# Patient Record
Sex: FEMALE | Race: WHITE | NOT HISPANIC OR LATINO | Employment: FULL TIME | URBAN - METROPOLITAN AREA
[De-identification: names, ages, dates, MRNs, and addresses within clinical notes are randomized per-mention and may not be internally consistent; named-entity substitution may affect disease eponyms.]

---

## 2017-01-05 ENCOUNTER — ALLSCRIPTS OFFICE VISIT (OUTPATIENT)
Dept: OTHER | Facility: OTHER | Age: 27
End: 2017-01-05

## 2017-03-03 ENCOUNTER — GENERIC CONVERSION - ENCOUNTER (OUTPATIENT)
Dept: OTHER | Facility: OTHER | Age: 27
End: 2017-03-03

## 2017-10-25 ENCOUNTER — ALLSCRIPTS OFFICE VISIT (OUTPATIENT)
Dept: OTHER | Facility: OTHER | Age: 27
End: 2017-10-25

## 2017-10-26 NOTE — PROCEDURES
Assessment  1  Amenorrhea (626 0) (N91 2)    Discussion/Summary  Discussion Summary:   LMP was 08/16/2017, corrected due date is 06/16/2018  Active Problems  1  Asthma (493 90) (J45 909)   2  Exercise-induced asthma (493 81) (J45 990)   3  H/O migraine (V12 49) (Z86 69)   4  Immune to varicella (V49 89) (Z78 9)   5  Umbilical hernia without obstruction and without gangrene (553 1) (K42 9)   6  Denied: History of Unknown varicella vaccination status    Current Meds  1  Prenatal Vitamin TABS; Therapy: (Recorded:25Oct2017) to Recorded    Allergies  1  Penicillins  2  No Known Environmental Allergies   3  No Known Food Allergies    Results/Data  [id number] Transvaginal Ultrasound OB [facility]:   Procedure: [id number] pregnant uterus real time with image documentation, fetal and maternal evaluation, first trimester (<14 weeks 0 days), transvaginal approach: single or first gestation  -- The study was done today in the office  Indication: EDC gestational age 8 0/7 weeks  Exam indication: amenorrhea  Findings:   Number of gestational sacs and fetuses: one  Gestational sac/fetal measurements appropriate for gestation: CRL is 0 69 cm equals 6 4/7 weeks with due date of 06/16/2018  Survey of visible fetal and placental anatomic structure cardiac motion is present  Qualitative assessment of amniotic fluid volume/gestational sac shape: nl    Exam of maternal uterus and adnexa: nl    Impression: viable IUP        Signatures   Electronically signed by : JOSHUA Evans ; Oct 25 2017  4:00PM EST                       (Author)

## 2017-12-01 ENCOUNTER — ALLSCRIPTS OFFICE VISIT (OUTPATIENT)
Dept: OTHER | Facility: OTHER | Age: 27
End: 2017-12-01

## 2017-12-01 ENCOUNTER — GENERIC CONVERSION - ENCOUNTER (OUTPATIENT)
Dept: OTHER | Facility: OTHER | Age: 27
End: 2017-12-01

## 2017-12-01 DIAGNOSIS — Z34.90 ENCOUNTER FOR SUPERVISION OF NORMAL PREGNANCY: ICD-10-CM

## 2017-12-06 ENCOUNTER — GENERIC CONVERSION - ENCOUNTER (OUTPATIENT)
Dept: OTHER | Facility: OTHER | Age: 27
End: 2017-12-06

## 2017-12-06 PROCEDURE — 87491 CHLMYD TRACH DNA AMP PROBE: CPT | Performed by: OBSTETRICS & GYNECOLOGY

## 2017-12-06 PROCEDURE — 87591 N.GONORRHOEAE DNA AMP PROB: CPT | Performed by: OBSTETRICS & GYNECOLOGY

## 2017-12-07 ENCOUNTER — LAB REQUISITION (OUTPATIENT)
Dept: LAB | Facility: HOSPITAL | Age: 27
End: 2017-12-07
Payer: COMMERCIAL

## 2017-12-07 DIAGNOSIS — Z34.90 ENCOUNTER FOR SUPERVISION OF NORMAL PREGNANCY: ICD-10-CM

## 2017-12-08 LAB
CHLAMYDIA DNA CVX QL NAA+PROBE: NORMAL
N GONORRHOEA DNA GENITAL QL NAA+PROBE: NORMAL

## 2018-01-05 ENCOUNTER — ALLSCRIPTS OFFICE VISIT (OUTPATIENT)
Dept: OTHER | Facility: OTHER | Age: 28
End: 2018-01-05

## 2018-01-05 ENCOUNTER — GENERIC CONVERSION - ENCOUNTER (OUTPATIENT)
Dept: OTHER | Facility: OTHER | Age: 28
End: 2018-01-05

## 2018-01-09 NOTE — MISCELLANEOUS
Message   Recorded as Task   Date: 12/28/2016 12:26 PM, Created By: Randolph Tierney   Task Name: Care Coordination   Assigned To: Zari Lane   Regarding Patient: Andra Ellison, Status: In Progress   Spring Elizalde - 28 Dec 2016 12:26 PM     TASK CREATED  Patient concerned about cost of [address] [place] [address], requesting a cheaper/generic oral contraceptive be sent to [address] [place] [address] S - 28 Dec 2016 1:03 PM     TASK [address],[facility]  [address] - 28 Dec 2016 1:11 PM     TASK EDITED  I told pt [facility] for 9$ a pack  Rx sprintec, sig 1 od, 1 mo 4 refills to [facility] in [facility]        Active Problems    1  Bacteria in urine (791 9) (R82 71)   2  Encounter for fetal anatomic survey (V28 81) (Z36)   3  Encounter for screening for risk of pre-term labor (V28 82) (Z36)   4  Encounter for supervision of other normal pregnancy in third trimester (V22 1) (Z34 83)   5  Exercise-induced asthma (493 81) (J45 990)   6  H/O migraine (V12 49) (Z86 69)   7  Immune to varicella (V49 89) (Z78 9)   8  Need for prophylactic vaccination with combined diphtheria-tetanus-pertussis (DTaP)   vaccine (V06 1) (Z23)   9  Oral contraceptive prescribed (V25 01) (Z30 011)   10  Pregnancy (V22 2) (Z33 1)   11  Denied: History of Unknown varicella vaccination status    Current Meds   1  Sprintec 28 0 25-35 MG-MCG Oral Tablet; Take 1 tablet daily; Therapy: 51QGK0375 to (Evaluate:24Mar2017)  Requested for: 16Hfp3872; Last   Rx:29Blo6148 Ordered   2  Sprintec 28 0 25-35 MG-MCG Oral Tablet; TAKE 1 TABLET DAILY; Therapy: 07ROC7213 to (Evaluate:04Kck6929) Recorded    Allergies    1  Penicillins    2  No Known Environmental Allergies   3  No Known Food Allergies    Signatures   Electronically signed by :  Gordy Higuera, ; Dec 28 2016  1:11PM EST                       (Author)

## 2018-01-14 VITALS
TEMPERATURE: 97.7 F | BODY MASS INDEX: 24.1 KG/M2 | WEIGHT: 136 LBS | HEIGHT: 63 IN | SYSTOLIC BLOOD PRESSURE: 110 MMHG | RESPIRATION RATE: 12 BRPM | DIASTOLIC BLOOD PRESSURE: 70 MMHG | HEART RATE: 70 BPM

## 2018-01-14 VITALS
DIASTOLIC BLOOD PRESSURE: 62 MMHG | BODY MASS INDEX: 24.45 KG/M2 | SYSTOLIC BLOOD PRESSURE: 104 MMHG | HEIGHT: 63 IN | WEIGHT: 138 LBS

## 2018-01-14 NOTE — MISCELLANEOUS
Message   Recorded as Task  Date: 04/02/2016 02:24 AM, Created By: Amor Jasso  Task Name: Review Document  Assigned To: Karen Singh  Regarding Patient: Dianna Monk, Status: In Progress  Comment:   Karen Singh - 02 Apr 2016 2:24 AM    TASK CREATED  please call [facility]- labs are normal except for UTI   please order macrobid 100mg po bid thank you  Minerva Cook - 04 Apr 2016 11:13 AM    TASK REPLIED TO: Previously Assigned To ESSIEGA OB,Team  [facility]mom to [facility]  JoeyMinerva - 04 Apr 2016 11:19 AM    TASK IN PROGRESS  Minerva ariza - 04 Apr 2016 12:12 PM    TASK REPLIED TO: Previously Assigned To JANEL OB,Team  p/c to pt aware  Rx called to pt's pharmacy  We discussed good hygiene practices and to increase fluids  Active Problems    1  Bacteria in urine (791 9) (N39 0)   2  Encounter for supervision of other normal pregnancy in first trimester (V22 1) (Z34 81)   3  Exercise-induced asthma (493 81) (J45 990)   4  H/O migraine (V12 49) (Z86 69)   5  Immune to varicella (V49 89) (Z78 9)   6  Need for prophylactic vaccination with combined diphtheria-tetanus-pertussis (DTaP)   vaccine (V06 1) (Z23)   7  Pregnancy, obstetrical care (V22 1) (Z34 90)   8  Denied: History of Unknown varicella vaccination status    Current Meds   1  Flintstones Complete 60 MG Oral Tablet Chewable; Therapy: (Recorded:30Mar2016) to Recorded   2  Nitrofurantoin Macrocrystal 100 MG Oral Capsule; TAKE 1 CAPSULE EVERY 12 HOURS   DAILY; Therapy: 04Apr2016 to (Evaluate:28Hnm5631) Recorded    Allergies    1  Penicillins    2  No Known Environmental Allergies   3   No Known Food Allergies    Signatures   Electronically signed by : Lali Peoples, ; Apr 4 2016 12:13PM EST                       (Author)

## 2018-01-16 NOTE — PROGRESS NOTES
2016         RE: Charlotte Taveras                                To: [facility]  MR#: [id number]                                  [address]   : [address]   ENC: [id number]:PATRICIA Estrella, [address]   (Exam #: [id number])                           Fax: [phone]      The LMP of this 26year old,  G2, P1-0-0-1 patient was 2016, giving   her an YONNY of OCT 25 2016 and a current gestational age of 25 weeks 0 days   by dates  A sonographic examination was performed on 2016 using real   time equipment  The ultrasound examination was performed using abdominal &   vaginal techniques  The patient has a BMI of 23 7  Her blood pressure   today was 109/73  Earliest ultrasound found in her record: 3/30/16 10w6d 10/20/16 YONNY      Thank you very much for your kind referral of Charlotte Taveras to the   [facility]  in [address] [place] [address] on 2016 for level II ultrasound   evaluation and  assessment  Maury Mendez is a 78-year-old  2   para 26 white female who is currently at 20-0/7 weeks gestation by an   estimated due date of 2016  Her prenatal course so far has   been unremarkable  Maury Mendez has no complaints  She denies vaginal   bleeding  She reports fetal movement  She declined genetic screening   earlier in the pregnancy      Maury Mendez has a history of a prior vaginal delivery at term in    following an uncomplicated prenatal course  She delivered a 6 lbs  4 oz    baby girl, currently healthy  She has a history of mild, intermittent   asthma and migraine headaches  Her past medical and surgical histories are   otherwise unremarkable  She currently takes no medication with the   exception of a vitamin on a daily basis  She has an allergy to penicillin  Maury Mendez denies tobacco, alcohol, or illicit drug use during the pregnancy        Cardiac motion was observed at 147 bpm       INDICATIONS      fetal anatomical survey      Exam Types      Transvaginal   LEVEL II      RESULTS      Fetus # 1 of 1   Vertex presentation   Fetal growth appeared normal   Placenta Location = Posterior   No placenta previa   Placenta Grade = I      MEASUREMENTS (* Included In Average GA)      AC              16 0 cm        20 weeks 6 days* (67%)   BPD              5 0 cm        21 weeks 2 days* (83%)   HC              18 5 cm        20 weeks 5 days* (68%)   Femur            3 4 cm        20 weeks 5 days* (54%)      Nuchal Fold      4 7 mm      Humerus          3 2 cm        20 weeks 6 days  (70%)   Radius           2 9 cm        22 weeks 2 days   Ulna             3 4 cm        23 weeks 2 days   Tibia            3 1 cm        21 weeks 4 days  (88%)   Fibula           3 1 cm        20 weeks 5 days   Foot             3 1 cm        19 weeks 4 days      Cerebellum       2 3 cm        22 weeks 2 days   Biorbit          3 3 cm        20 weeks 6 days   CisternaMagna    5 9 mm      HC/AC           1 15   FL/AC           0 21   FL/BPD          0 67   EFW (Ac/Fl/Hc)   380 grams - 0 lbs 13 oz      THE AVERAGE GESTATIONAL AGE is 20 weeks 6 days +/- 10 days  AMNIOTIC FLUID         Largest Vertical Pocket = 4 9 cm   Amniotic Fluid: Normal      ANATOMY SUMMARY      The fetal cranium appeared normal in shape  Choroid plexus cysts are not   present  The lateral ventricles were not dilated and the midline   structures were not deviated  The cerebellum and cisterna magna were   visualized and appeared normal  The nuchal fold is not abnormally   thickened, measured at 4 7 mm  The calvarium showed no evidence of defect,   scalloping of the parietal bones or abnormal shape  The cavum septum   pellucidum appears normal  The fetal face appears normal  There is no   evidence of facial clefting, hypotelorism, hypertelorism or micrognathia     A normal appearing nasal bone measuring 8 3 mm was identified in the   sagittal profile view  3-D views of the face appeared normal  Anatomy of   the fetal thorax appeared within normal limits  The fetal diaphragm   appears intact  There were no intrathoracic masses noted or evidence of   pleural/pericardial effusion  The cardiac arch views appeared normal     There was no suspicion of tricuspid regurgitation  The cardiac size and   structures appeared sonographically normal at the four chamber view, and   cardiac rhythm was regular  There is no suspicion of fetal dysrhythmia  There was no evidence of cardiomegaly, pericardial effusion or   atrial/ventricular disproportion  Two atrioventricular valves were noted   with normal inflow, confirmed with color Doppler imaging  Ventriculoarterial connections are appropriate and normal short axis of   the great vessels is demonstrated  The interventricular septum appeared to   be intact  There is no suspicion of an echogenic intracardiac focus  The   three vessel  tracheal view appears normal   The outflow tract views are   normal  The abdominal cavity appears normal  There is no evidence of fetal   bowel obstruction or abnormally echogenic bowel  Ascites is not present  The fetal stomach appears normal in size and shape  The right kidney   appears normal  There is no suspicion of pyelectasis  Renal cysts are not   present  The echogenicity of the kidney is normal  The left kidney   appears normal   There is no suspicion of pyelectasis  The echogenicity   of the kidney is normal   renal cysts are not present  The fetal bladder   appears normal in size and shape  There is no suspicion of ureterocele  The abdominal wall appears intact  A normal abdominal cord insertion is   noted  The spine was visualized from cervical to sacral region, within the   resolution of the ultrasound equipment, without evidence of a neural tube   defect  or other malformation   Active movement of the extremities was seen   and fetal body motion was also observed during this examination  There was   no evidence of long bone abnormality and the extremities were followed to   their distal extent  There was no evidence of club foot or rocker bottom   deformity  There was no evidence of abnormal hand posturing noted  The   genitalia appeared normal  The placenta appears normal  There is no   evidence of advanced placental maturation, placental abruption,   intervillous thrombosis, placental infarction or multiple venous lakes  There is a 3 vessel cord with normal insertion site  The uterine contour   appears normal  There is no suspicion of a uterine myoma  ADNEXA      The left ovary was not visualized  The right ovary appeared normal and   measured 3 6 x 2 1 x 1 8 cm with a volume of 7 1 cc  UTERINE ARTERIES                                  S/D   PI    RI    NOTCH       Left Uterine Artery              1 27         No       Right Uterine Artery             0 76         No      CERVICAL EVALUATION      The cervix appeared normal (Ultrasound Examination)  SUPINE      Cervical Length: 3 50 cm      OTHER TEST RESULTS           Funneling?: No             Dynamic Changes?: No        Resp  To TFP?: No      IMPRESSION      López IUP   20 weeks and 6 days by this ultrasound  (YONNY=OCT 19 2016)   Vertex presentation   Fetal growth appeared normal   Normal anatomy survey   Regular fetal heart rate of 147 bpm   Posterior placenta   No placenta previa      GENERAL COMMENT      No fetal structural abnormality or ultrasound marker for aneuploidy is   identified on the Level II ultrasound study today  Fetal growth, amniotic   fluid volume, and maternal uterine artery Doppler study are normal   The   placenta is normal in appearance  The cervix is normal in appearance by transvaginal sonography  The   cervical length is normal   Cervical debris is not present  Cervical   funneling is not present  Neither provocative nor dynamic change is   appreciated        Today's ultrasound findings and suggested follow-up were discussed in   detail with Bianca Nance  We discussed that prenatal ultrasound cannot rule   out all congenital abnormalities  No further appointment has been   scheduled in the [address] [place] [address] for Bianca Nance at this time  Follow-up   [facility] ultrasound evaluation is recommended as clinically indicated  The face to face time, in addition to time spent discussing ultrasound   results, was 10 minutes, greater than 50% of which was spent during   counseling and coordination of care  KEVIN Aggarwal M D     Maternal-Fetal Medicine   Electronically signed 06/08/16 10:45

## 2018-01-16 NOTE — MISCELLANEOUS
Message   Recorded as Task   Date: 03/02/2017 04:38 PM, Created By: Reece Gill   Task Name: Call Back   Assigned To: Renard Multani   Regarding Patient: Nate Coleman, Status: In Progress   Comment:    Sasha Nix - 02 Mar 2017 4:38 PM     TASK CREATED  Caller: Self; Other; [phone] (Home); [phone] (Work)  pts bc sprintec is making her nauseous,  she would like something else prescribed if possible, pls call pt to discuss @[phone],  d87 is prov   Sandra Muhammad - 02 Mar 2017 5:17 PM     TASK IN PROGRESS   Sandra Muhammad - 02 Mar 2017 5:23 PM     TASK REPLIED TO: Previously Assigned To Terri Koehler                      Pt on [address] [place] [address] and she's getting nauseous  Tried with/without food and different times of day/night still making her sick  She does not want depo, she does not want an IUD at this point  Mario Emily said the last time she took ocp's she tried 7 before she found one that worked and unfortunately can't remember name of it  Can you please suggest another pill  ([facility] [place])  Thank you   Terri Koehler - 02 Mar 2017 5:45 PM     TASK REPLIED TO: Previously Assigned To [facility]  Let's try Loestrin 24  That has a generic so should not be too expensive but is a lower dose  ERx sent  Please just let her know  Thanks!! Marybeth Gu - 03 Mar 2017 8:44 AM     TASK EDITED  Patient notified Shannan Jara is at her pharmacy  Active Problems    1  Asthma (493 90) (J45 909)   2  Bacteria in urine (791 9) (R82 71)   3  Encounter for fetal anatomic survey (V28 81) (Z36)   4  Encounter for screening for risk of pre-term labor (V28 82) (Z36)   5  Encounter for supervision of other normal pregnancy in third trimester (V22 1) (Z34 83)   6  Exercise-induced asthma (493 81) (J45 990)   7  H/O migraine (V12 49) (Z86 69)   8  Immune to varicella (V49 89) (Z78 9)   9   Need for prophylactic vaccination with combined diphtheria-tetanus-pertussis (DTaP)   vaccine (V06 1) (Z23)   10  Oral contraceptive prescribed (V25 01) (Z30 011)   11  Umbilical hernia without obstruction and without gangrene (553 1) (K42 9)   12  Denied: History of Unknown varicella vaccination status    Current Meds   1  Lomedia 24 FE 1-20 MG-MCG(24) Oral Tablet; Take 1 pill daily; Therapy: 61UVF3736 to (Last Rx:02Mar2017)  Requested for: 99YEY5341 Ordered   2  Sprintec 28 0 25-35 MG-MCG Oral Tablet; Take 1 tablet daily; Therapy: 21EXD1961 to (Evaluate:24Mar2017)  Requested for: 86Idz7970; Last   Rx:14Jdm5503 Ordered   3  Sprintec 28 0 25-35 MG-MCG Oral Tablet; TAKE 1 TABLET DAILY; Therapy: 26UXF9840 to (Evaluate:22Vav8275) Recorded    Allergies    1  Penicillins    2  No Known Environmental Allergies   3   No Known Food Allergies    Signatures   Electronically signed by : Ishan Funez, ; Mar  3 2017  8:46AM EST                       (Author)

## 2018-01-24 VITALS
WEIGHT: 140 LBS | BODY MASS INDEX: 24.8 KG/M2 | SYSTOLIC BLOOD PRESSURE: 110 MMHG | DIASTOLIC BLOOD PRESSURE: 70 MMHG | HEIGHT: 63 IN

## 2018-01-24 VITALS
DIASTOLIC BLOOD PRESSURE: 60 MMHG | HEIGHT: 63 IN | BODY MASS INDEX: 24.1 KG/M2 | SYSTOLIC BLOOD PRESSURE: 100 MMHG | WEIGHT: 136 LBS

## 2018-01-24 VITALS
DIASTOLIC BLOOD PRESSURE: 68 MMHG | BODY MASS INDEX: 24.63 KG/M2 | SYSTOLIC BLOOD PRESSURE: 112 MMHG | HEIGHT: 63 IN | WEIGHT: 139 LBS

## 2018-02-03 LAB
ABO GROUP BLD: NORMAL
APPEARANCE UR: CLEAR
BACTERIA UR CULT: NORMAL
BACTERIA URNS QL MICRO: ABNORMAL
BASOPHILS # BLD AUTO: 0 X10E3/UL (ref 0–0.2)
BASOPHILS NFR BLD AUTO: 0 %
BILIRUB UR QL STRIP: NEGATIVE
BLD GP AB SCN SERPL QL: NEGATIVE
COLOR UR: YELLOW
EOSINOPHIL # BLD AUTO: 0.1 X10E3/UL (ref 0–0.4)
EOSINOPHIL NFR BLD AUTO: 1 %
EPI CELLS #/AREA URNS HPF: ABNORMAL /HPF
ERYTHROCYTE [DISTWIDTH] IN BLOOD BY AUTOMATED COUNT: 14.9 % (ref 12.3–15.4)
GLUCOSE UR QL: NEGATIVE
HBV SURFACE AB SER QL: REACTIVE
HBV SURFACE AG SERPL QL IA: NEGATIVE
HCT VFR BLD AUTO: 32 % (ref 34–46.6)
HGB BLD-MCNC: 11.1 G/DL (ref 11.1–15.9)
HGB UR QL STRIP: NEGATIVE
HIV 1+2 AB+HIV1 P24 AG SERPL QL IA: NON REACTIVE
IMM GRANULOCYTES # BLD: 0 X10E3/UL (ref 0–0.1)
IMM GRANULOCYTES NFR BLD: 0 %
KETONES UR QL STRIP: NEGATIVE
LEUKOCYTE ESTERASE UR QL STRIP: ABNORMAL
LYMPHOCYTES # BLD AUTO: 2.2 X10E3/UL (ref 0.7–3.1)
LYMPHOCYTES NFR BLD AUTO: 20 %
Lab: NO GROWTH
MCH RBC QN AUTO: 31.7 PG (ref 26.6–33)
MCHC RBC AUTO-ENTMCNC: 34.7 G/DL (ref 31.5–35.7)
MCV RBC AUTO: 91 FL (ref 79–97)
MICRO URNS: ABNORMAL
MONOCYTES # BLD AUTO: 0.8 X10E3/UL (ref 0.1–0.9)
MONOCYTES NFR BLD AUTO: 7 %
MUCOUS THREADS URNS QL MICRO: PRESENT
NEUTROPHILS # BLD AUTO: 8.2 X10E3/UL (ref 1.4–7)
NEUTROPHILS NFR BLD AUTO: 72 %
NITRITE UR QL STRIP: NEGATIVE
PH UR STRIP: 6 [PH] (ref 5–7.5)
PLATELET # BLD AUTO: 245 X10E3/UL (ref 150–379)
PROT UR QL STRIP: NEGATIVE
RBC # BLD AUTO: 3.5 X10E6/UL (ref 3.77–5.28)
RBC #/AREA URNS HPF: ABNORMAL /HPF
RH BLD: POSITIVE
RPR SER QL: NON REACTIVE
RUBV IGG SERPL IA-ACNC: 21.3 INDEX
SP GR UR: 1.02 (ref 1–1.03)
UROBILINOGEN UR STRIP-ACNC: 0.2 EU/DL (ref 0.2–1)
WBC # BLD AUTO: 11.4 X10E3/UL (ref 3.4–10.8)
WBC #/AREA URNS HPF: ABNORMAL /HPF

## 2018-02-09 ENCOUNTER — ROUTINE PRENATAL (OUTPATIENT)
Dept: OBGYN CLINIC | Facility: MEDICAL CENTER | Age: 28
End: 2018-02-09

## 2018-02-09 ENCOUNTER — ROUTINE PRENATAL (OUTPATIENT)
Dept: PERINATAL CARE | Facility: MEDICAL CENTER | Age: 28
End: 2018-02-09
Payer: COMMERCIAL

## 2018-02-09 VITALS
DIASTOLIC BLOOD PRESSURE: 67 MMHG | WEIGHT: 139.6 LBS | BODY MASS INDEX: 24.73 KG/M2 | SYSTOLIC BLOOD PRESSURE: 116 MMHG | HEIGHT: 63 IN | HEART RATE: 89 BPM

## 2018-02-09 VITALS — WEIGHT: 139.2 LBS | BODY MASS INDEX: 24.66 KG/M2 | DIASTOLIC BLOOD PRESSURE: 76 MMHG | SYSTOLIC BLOOD PRESSURE: 120 MMHG

## 2018-02-09 DIAGNOSIS — Z36.3 ENCOUNTER FOR ANTENATAL SCREENING FOR MALFORMATIONS: ICD-10-CM

## 2018-02-09 DIAGNOSIS — Z36.86 ENCOUNTER FOR ANTENATAL SCREENING FOR CERVICAL LENGTH: Primary | ICD-10-CM

## 2018-02-09 DIAGNOSIS — Z3A.21 21 WEEKS GESTATION OF PREGNANCY: Primary | ICD-10-CM

## 2018-02-09 PROCEDURE — 76805 OB US >/= 14 WKS SNGL FETUS: CPT | Performed by: OBSTETRICS & GYNECOLOGY

## 2018-02-09 PROCEDURE — 76817 TRANSVAGINAL US OBSTETRIC: CPT | Performed by: OBSTETRICS & GYNECOLOGY

## 2018-02-09 PROCEDURE — 99212 OFFICE O/P EST SF 10 MIN: CPT | Performed by: OBSTETRICS & GYNECOLOGY

## 2018-02-09 PROCEDURE — PNV: Performed by: OBSTETRICS & GYNECOLOGY

## 2018-02-09 NOTE — PROGRESS NOTES
Please refer to the [facility] ultrasound report for additional information regarding the visit of Norleen Moritz to the [facility]  in [employer] today

## 2018-02-09 NOTE — PROGRESS NOTES
Feels well  No problems  Just had level 2, report pending  WNL per pt   3cm umbilical hernia on exam, precautions reviewed  Undecided about flu vacc, benefits reviewed

## 2018-02-09 NOTE — LETTER
February 9, 2018     Gifty Emanuel MD  [facility]  [address] [address]    Patient: Jm Yoon   YOB: 1990   Date of Visit: 2/9/2018       Dear Dr Jamin Mancilla: Thank you for referring Jm Yoon to me for evaluation  Below are my notes for this consultation  If you have questions, please do not hesitate to call me  I look forward to following your patient along with you           Sincerely,        Nathalie Tubbs MD        CC: No Recipients

## 2018-03-06 ENCOUNTER — ROUTINE PRENATAL (OUTPATIENT)
Dept: OBGYN CLINIC | Facility: CLINIC | Age: 28
End: 2018-03-06

## 2018-03-06 VITALS — SYSTOLIC BLOOD PRESSURE: 122 MMHG | DIASTOLIC BLOOD PRESSURE: 52 MMHG | BODY MASS INDEX: 25.86 KG/M2 | WEIGHT: 146 LBS

## 2018-03-06 DIAGNOSIS — Z34.82 ENCOUNTER FOR SUPERVISION OF OTHER NORMAL PREGNANCY IN SECOND TRIMESTER: Primary | ICD-10-CM

## 2018-03-06 DIAGNOSIS — Z3A.28 28 WEEKS GESTATION OF PREGNANCY: ICD-10-CM

## 2018-03-06 PROBLEM — J45.909 ASTHMA: Status: ACTIVE | Noted: 2017-01-05

## 2018-03-06 PROBLEM — K42.9 UMBILICAL HERNIA WITHOUT OBSTRUCTION AND WITHOUT GANGRENE: Status: ACTIVE | Noted: 2017-01-05

## 2018-03-06 PROCEDURE — PNV: Performed by: NURSE PRACTITIONER

## 2018-03-06 NOTE — PROGRESS NOTES
Problem List Items Addressed This Visit     None      Visit Diagnoses     Encounter for supervision of other normal pregnancy in second trimester    -  Primary    28 weeks gestation of pregnancy        Relevant Orders    RPR    CBC and differential    Glucose, 1H PG        Feels great  Here with her children  Declines immunizations until next visit  Reports good fetal movement  28 week lab slip given

## 2018-03-06 NOTE — PATIENT INSTRUCTIONS
Pregnancy at 23 to 26 Weeks   AMBULATORY CARE:   What changes are happening to your body: You are now close to or at the beginning of the third trimester  The third trimester starts at 24 weeks and ends with delivery  As your baby gets larger, you may develop certain symptoms  These may include pain in your back or down the sides of your abdomen  You may also have stretch marks on your abdomen, breasts, thighs, or buttocks  You may also have constipation  Seek care immediately if:   · You develop a severe headache that does not go away  · You have new or increased vision changes, such as blurred or spotted vision  · You have new or increased swelling in your face or hands  · You have vaginal spotting or bleeding  · Your water broke or you feel warm water gushing or trickling from your vagina  Contact your healthcare provider if:   · You have abdominal cramps, pressure, or tightening  · You have a change in vaginal discharge  · You have light bleeding  · You have chills or a fever  · You have vaginal itching, burning, or pain  · You have yellow, green, white, or foul-smelling vaginal discharge  · You have pain or burning when you urinate, less urine than usual, or pink or bloody urine  · You have questions or concerns about your condition or care  How to care for yourself at this stage of your pregnancy:   · Eat a variety of healthy foods  Healthy foods include fruits, vegetables, whole-grain breads, low-fat dairy foods, beans, lean meats, and fish  Drink liquids as directed  Ask how much liquid to drink each day and which liquids are best for you  Limit caffeine to less than 200 milligrams each day  Limit your intake of fish to 2 servings each week  Choose fish low in mercury such as canned light tuna, shrimp, salmon, cod, or tilapia  Do not  eat fish high in mercury such as swordfish, tilefish, alfred mackerel, and shark  · Manage back pain    Do not stand for long periods of time or lift heavy items  Use good posture while you stand, squat, or bend  Wear low-heeled shoes with good support  Rest may also help to relieve back pain  Ask your healthcare provider about exercises you can do to strengthen your back muscles  · Take prenatal vitamins as directed  Your need for certain vitamins and minerals, such as folic acid, increases during pregnancy  Prenatal vitamins provide some of the extra vitamins and minerals you need  Prenatal vitamins may also help to decrease the risk of certain birth defects  · Talk to your healthcare provider about exercise  Moderate exercise can help you stay fit  Your healthcare provider will help you plan an exercise program that is safe for you during pregnancy  · Do not smoke  If you smoke, it is never too late to quit  Smoking increases your risk of a miscarriage and other health problems during your pregnancy  Smoking can cause your baby to be born too early or weigh less at birth  Ask your healthcare provider for information if you need help quitting  · Do not drink alcohol  Alcohol passes from your body to your baby through the placenta  It can affect your baby's brain development and cause fetal alcohol syndrome (FAS)  FAS is a group of conditions that causes mental, behavior, and growth problems  · Talk to your healthcare provider before you take any medicines  Many medicines may harm your baby if you take them when you are pregnant  Do not take any medicines, vitamins, herbs, or supplements without first talking to your healthcare provider  Never use illegal or street drugs (such as marijuana or cocaine) while you are pregnant  Safety tips during pregnancy:   · Avoid hot tubs and saunas  Do not use a hot tub or sauna while you are pregnant, especially during your first trimester  Hot tubs and saunas may raise your baby's temperature and increase the risk of birth defects  · Avoid toxoplasmosis    This is an infection caused by eating raw meat or being around infected cat feces  It can cause birth defects, miscarriages, and other problems  Wash your hands after you touch raw meat  Make sure any meat is well-cooked before you eat it  Avoid raw eggs and unpasteurized milk  Use gloves or ask someone else to clean your cat's litter box while you are pregnant  Changes that are happening with your baby:  By 26 weeks, your baby will weigh about 2 pounds  Your baby will be about 10 inches long from the top of the head to the rump (baby's bottom)  Your baby's movements are much stronger now  Your baby's eyes are almost completely formed and can partially open  Your baby also sleeps and wakes up  What you need to know about prenatal care: Your healthcare provider will check your blood pressure and weight  You may also need the following:  · A urine test  may also be done to check for sugar and protein  These can be signs of gestational diabetes or infection  Protein in your urine may also be a sign of preeclampsia  Preeclampsia is a condition that can develop during week 20 or later of your pregnancy  It causes high blood pressure, and it can cause problems with your kidneys and other organs  · Fundal height  is a measurement of your uterus to check your baby's growth  This number is usually the same as the number of weeks that you have been pregnant  · Your baby's heart rate  will be checked  © 2017 [address] [employer] Information is for End User's use only and may not be sold, redistributed or otherwise used for commercial purposes  All illustrations and images included in CareNotes® are the copyrighted property of [employer] M [employer]  or [address]  The above information is an  only  It is not intended as medical advice for individual conditions or treatments  Talk to your doctor, nurse or pharmacist before following any medical regimen to see if it is safe and effective for you

## 2018-03-07 NOTE — PROGRESS NOTES
Education  Baby & Me Education 1st Trimester:   First Trimester Education provided:   benefits of breastfeeding, importance of exclusive breastfeeding, early initiation of breastfeeding, exclusive breastfeeding for the first 6 months and Pregnancy Essentials Reference Guide given   The patient is planning on breastfeeding  Prenatal education provided by: Virgil Barnhart MA      Signatures   Electronically signed by :  [facility], ; Dec  1 2017  5:10PM EST                       (Co-author)

## 2018-03-26 NOTE — PROGRESS NOTES
Spoke with pt - she is currently 28w4d  She is going for her labs this week  Will recheck on Friday

## 2018-04-03 LAB
BASOPHILS # BLD AUTO: 0 X10E3/UL (ref 0–0.2)
BASOPHILS NFR BLD AUTO: 0 %
EOSINOPHIL # BLD AUTO: 0.1 X10E3/UL (ref 0–0.4)
EOSINOPHIL NFR BLD AUTO: 1 %
ERYTHROCYTE [DISTWIDTH] IN BLOOD BY AUTOMATED COUNT: 14 % (ref 12.3–15.4)
GLUCOSE 1H P 50 G GLC PO SERPL-MCNC: 90 MG/DL (ref 65–139)
HCT VFR BLD AUTO: 31.5 % (ref 34–46.6)
HGB BLD-MCNC: 10.5 G/DL (ref 11.1–15.9)
IMM GRANULOCYTES # BLD: 0.1 X10E3/UL (ref 0–0.1)
IMM GRANULOCYTES NFR BLD: 1 %
LYMPHOCYTES # BLD AUTO: 2.2 X10E3/UL (ref 0.7–3.1)
LYMPHOCYTES NFR BLD AUTO: 22 %
MCH RBC QN AUTO: 30.2 PG (ref 26.6–33)
MCHC RBC AUTO-ENTMCNC: 33.3 G/DL (ref 31.5–35.7)
MCV RBC AUTO: 91 FL (ref 79–97)
MONOCYTES # BLD AUTO: 0.8 X10E3/UL (ref 0.1–0.9)
MONOCYTES NFR BLD AUTO: 7 %
NEUTROPHILS # BLD AUTO: 7.1 X10E3/UL (ref 1.4–7)
NEUTROPHILS NFR BLD AUTO: 69 %
PLATELET # BLD AUTO: 257 X10E3/UL (ref 150–379)
RBC # BLD AUTO: 3.48 X10E6/UL (ref 3.77–5.28)
RPR SER QL: NON REACTIVE
WBC # BLD AUTO: 10.2 X10E3/UL (ref 3.4–10.8)

## 2018-04-04 ENCOUNTER — ROUTINE PRENATAL (OUTPATIENT)
Dept: OBGYN CLINIC | Age: 28
End: 2018-04-04

## 2018-04-04 VITALS — SYSTOLIC BLOOD PRESSURE: 112 MMHG | WEIGHT: 151 LBS | DIASTOLIC BLOOD PRESSURE: 66 MMHG | BODY MASS INDEX: 26.75 KG/M2

## 2018-04-04 DIAGNOSIS — Z34.83 ENCOUNTER FOR SUPERVISION OF OTHER NORMAL PREGNANCY IN THIRD TRIMESTER: Primary | ICD-10-CM

## 2018-04-04 PROBLEM — Z34.90 SUPERVISION OF NORMAL PREGNANCY: Status: ACTIVE | Noted: 2018-04-04

## 2018-04-04 PROBLEM — Z34.90 SUPERVISION OF NORMAL PREGNANCY: Status: RESOLVED | Noted: 2018-04-04 | Resolved: 2018-04-04

## 2018-04-04 PROCEDURE — PNV: Performed by: OBSTETRICS & GYNECOLOGY

## 2018-04-04 RX ORDER — CALCIUM CARBONATE 200(500)MG
2 TABLET,CHEWABLE ORAL EVERY 4 HOURS PRN
COMMUNITY
End: 2018-05-30 | Stop reason: HOSPADM

## 2018-04-04 NOTE — PATIENT INSTRUCTIONS
Gastroesophageal Reflux Disease   WHAT YOU NEED TO KNOW:   Gastroesophageal reflux occurs when acid and food in the stomach back up into the esophagus  Gastroesophageal reflux disease (GERD) is reflux that occurs more than twice a week for a few weeks  It usually causes heartburn and other symptoms  GERD can cause other health problems over time if it is not treated  DISCHARGE INSTRUCTIONS:   Return to the emergency department if:   · You feel full and cannot burp or vomit  · You have severe chest pain and sudden trouble breathing  · Your bowel movements are black, bloody, or tarry-looking  · Your vomit looks like coffee grounds or has blood in it  Contact your healthcare provider if:   · You vomit large amounts, or you vomit often  · You have trouble breathing after you vomit  · You have trouble swallowing, or pain with swallowing  · You are losing weight without trying  · Your symptoms get worse or do not improve with treatment  · You have questions or concerns about your condition or care  Medicines:   · Medicines  are used to decrease stomach acid  Medicine may also be used to help your lower esophageal sphincter and stomach contract (tighten) more  · Take your medicine as directed  Contact your healthcare provider if you think your medicine is not helping or if you have side effects  Tell him of her if you are allergic to any medicine  Keep a list of the medicines, vitamins, and herbs you take  Include the amounts, and when and why you take them  Bring the list or the pill bottles to follow-up visits  Carry your medicine list with you in case of an emergency  Manage GERD:   · Do not have foods or drinks that may increase heartburn  These include chocolate, peppermint, fried or fatty foods, drinks that contain caffeine, or carbonated drinks (soda)  Other foods include spicy foods, onions, tomatoes, and tomato-based foods   Do not have foods or drinks that can irritate your esophagus, such as citrus fruits, juices, and alcohol  · Do not eat large meals  When you eat a lot of food at one time, your stomach needs more acid to digest it  Eat 6 small meals each day instead of 3 large ones, and eat slowly  Do not eat meals 2 to 3 hours before bedtime  · Elevate the head of your bed  Place 6-inch blocks under the head of your bed frame  You may also use more than one pillow under your head and shoulders while you sleep  · Maintain a healthy weight  If you are overweight, weight loss may help relieve symptoms of GERD  · Do not smoke  Smoking weakens the lower esophageal sphincter and increases the risk of GERD  Ask your healthcare provider for information if you currently smoke and need help to quit  E-cigarettes or smokeless tobacco still contain nicotine  Talk to your healthcare provider before you use these products  · Do not wear clothing that is tight around your waist   Tight clothing can put pressure on your stomach and cause or worsen GERD symptoms  Follow up with your healthcare provider as directed:  Write down your questions so you remember to ask them during your visits  © 2017 [address] [facility] Information is for End User's use only and may not be sold, redistributed or otherwise used for commercial purposes  All illustrations and images included in CareNotes® are the copyrighted property of [employer]  or [employer]  The above information is an  only  It is not intended as medical advice for individual conditions or treatments  Talk to your doctor, nurse or pharmacist before following any medical regimen to see if it is safe and effective for you

## 2018-04-04 NOTE — PROGRESS NOTES
32 yo  29w4d  Feels well, no complaints  28 week labs wnl  HGb 10 1  Continue PNV, hydration, healthy diet     Problem List Items Addressed This Visit        Other    Supervision of normal pregnancy - Primary     PNC scan - wnl, no further scans scheduled  Declines vaccinations  Readdress at next visit

## 2018-04-16 ENCOUNTER — ROUTINE PRENATAL (OUTPATIENT)
Dept: OBGYN CLINIC | Age: 28
End: 2018-04-16

## 2018-04-16 VITALS — SYSTOLIC BLOOD PRESSURE: 110 MMHG | WEIGHT: 153 LBS | DIASTOLIC BLOOD PRESSURE: 60 MMHG | BODY MASS INDEX: 27.1 KG/M2

## 2018-04-16 DIAGNOSIS — Z3A.31 31 WEEKS GESTATION OF PREGNANCY: ICD-10-CM

## 2018-04-16 DIAGNOSIS — K42.9 UMBILICAL HERNIA WITHOUT OBSTRUCTION AND WITHOUT GANGRENE: ICD-10-CM

## 2018-04-16 DIAGNOSIS — Z34.83 ENCOUNTER FOR SUPERVISION OF OTHER NORMAL PREGNANCY IN THIRD TRIMESTER: Primary | ICD-10-CM

## 2018-04-16 PROCEDURE — PNV: Performed by: NURSE PRACTITIONER

## 2018-04-16 NOTE — PROGRESS NOTES
Problem List Items Addressed This Visit     Umbilical hernia without obstruction and without gangrene    Supervision of normal pregnancy - Primary    31 weeks gestation of pregnancy        Feels well  Denies LOF/CTX/VB  No concerns  Requests TDAP at next visit  Check in card given  Hgb 10 5-advised iron od

## 2018-04-16 NOTE — PATIENT INSTRUCTIONS
Pregnancy at 31 to [address]:   What changes are happening in my body? You may continue to have symptoms such as shortness of breath, heartburn, contractions, or swelling of your ankles and feet  You may be gaining about 1 pound a week now  How do I care for myself at this stage of my pregnancy? · Eat a variety of healthy foods  Healthy foods include fruits, vegetables, whole-grain breads, low-fat dairy foods, beans, lean meats, and fish  Drink liquids as directed  Ask how much liquid to drink each day and which liquids are best for you  Limit caffeine to less than 200 milligrams each day  Limit your intake of fish to 2 servings each week  Choose fish low in mercury such as canned light tuna, shrimp, salmon, cod, or tilapia  Do not  eat fish high in mercury such as swordfish, tilefish, alfred mackerel, and shark  · Manage heartburn  by eating 4 or 5 small meals each day instead of large meals  Avoid spicy food  · Manage swelling  by lying down and putting your feet up  · Take prenatal vitamins as directed  Your need for certain vitamins and minerals, such as folic acid, increases during pregnancy  Prenatal vitamins provide some of the extra vitamins and minerals you need  Prenatal vitamins may also help to decrease the risk of certain birth defects  · Talk to your healthcare provider about exercise  Moderate exercise can help you stay fit  Your healthcare provider will help you plan an exercise program that is safe for you during pregnancy  · Do not smoke  If you smoke, it is never too late to quit  Smoking increases your risk of a miscarriage and other health problems during your pregnancy  Smoking can cause your baby to be born too early or weigh less at birth  Ask your healthcare provider for information if you need help quitting  · Do not drink alcohol  Alcohol passes from your body to your baby through the placenta   It can affect your baby's brain development and cause fetal alcohol syndrome (FAS)  FAS is a group of conditions that causes mental, behavior, and growth problems  · Talk to your healthcare provider before you take any medicines  Many medicines may harm your baby if you take them when you are pregnant  Do not take any medicines, vitamins, herbs, or supplements without first talking to your healthcare provider  Never use illegal or street drugs (such as marijuana or cocaine) while you are pregnant  What are some safety tips during pregnancy? · Avoid hot tubs and saunas  Do not use a hot tub or sauna while you are pregnant, especially during your first trimester  Hot tubs and saunas may raise your baby's temperature and increase the risk of birth defects  · Avoid toxoplasmosis  This is an infection caused by eating raw meat or being around infected cat feces  It can cause birth defects, miscarriages, and other problems  Wash your hands after you touch raw meat  Make sure any meat is well-cooked before you eat it  Avoid raw eggs and unpasteurized milk  Use gloves or ask someone else to clean your cat's litter box while you are pregnant  What changes are happening with my baby? By 34 weeks, your baby may weigh more than 5 pounds  Your baby will be about 12 ½ inches long from the top of the head to the rump (baby's bottom)  Your baby is gaining about ½ pound a week  Your baby's eyes open and close now  Your baby's kicks and movements are more forceful at this time  What do I need to know about prenatal care? Your healthcare provider will check your blood pressure and weight  You may also need the following:  · A urine test  may also be done to check for sugar and protein  These can be signs of gestational diabetes or infection  Protein in your urine may also be a sign of preeclampsia  Preeclampsia is a condition that can develop during week 20 or later of your pregnancy   It causes high blood pressure, and it can cause problems with your kidneys and other organs  · A Tdap vaccine  may be recommended by your healthcare provider  · Fundal height  is a measurement of your uterus to check your baby's growth  This number is usually the same as the number of weeks that you have been pregnant  Your healthcare provider may also check your baby's position  · Your baby's heart rate  will be checked  When should I seek immediate care? · You develop a severe headache that does not go away  · You have new or increased vision changes, such as blurred or spotted vision  · You have new or increased swelling in your face or hands  · You have vaginal spotting or bleeding  · Your water broke or you feel warm water gushing or trickling from your vagina  When should I contact my healthcare provider? · You have more than 5 contractions in 1 hour  · You notice any changes in your baby's movements  · You have abdominal cramps, pressure, or tightening  · You have a change in vaginal discharge  · You have chills or a fever  · You have vaginal itching, burning, or pain  · You have yellow, green, white, or foul-smelling vaginal discharge  · You have pain or burning when you urinate, less urine than usual, or pink or bloody urine  · You have questions or concerns about your condition or care  CARE AGREEMENT:   You have the right to help plan your care  Learn about your health condition and how it may be treated  Discuss treatment options with your caregivers to decide what care you want to receive  You always have the right to refuse treatment  The above information is an  only  It is not intended as medical advice for individual conditions or treatments  Talk to your doctor, nurse or pharmacist before following any medical regimen to see if it is safe and effective for you    © 2017 [employer] Information is for End User's use only and may not be sold, redistributed or otherwise used for commercial purposes  All illustrations and images included in CareNotes® are the copyrighted property of [employer] M [employer] Inc  or [employer]

## 2018-05-02 ENCOUNTER — ROUTINE PRENATAL (OUTPATIENT)
Dept: OBGYN CLINIC | Age: 28
End: 2018-05-02
Payer: COMMERCIAL

## 2018-05-02 VITALS — SYSTOLIC BLOOD PRESSURE: 112 MMHG | DIASTOLIC BLOOD PRESSURE: 60 MMHG | BODY MASS INDEX: 26.93 KG/M2 | WEIGHT: 152 LBS

## 2018-05-02 DIAGNOSIS — Z23 NEED FOR TDAP VACCINATION: ICD-10-CM

## 2018-05-02 DIAGNOSIS — D64.9 ANEMIA, UNSPECIFIED TYPE: ICD-10-CM

## 2018-05-02 DIAGNOSIS — Z3A.33 33 WEEKS GESTATION OF PREGNANCY: ICD-10-CM

## 2018-05-02 DIAGNOSIS — Z34.83 ENCOUNTER FOR SUPERVISION OF OTHER NORMAL PREGNANCY IN THIRD TRIMESTER: Primary | ICD-10-CM

## 2018-05-02 PROCEDURE — 90715 TDAP VACCINE 7 YRS/> IM: CPT | Performed by: NURSE PRACTITIONER

## 2018-05-02 PROCEDURE — 90471 IMMUNIZATION ADMIN: CPT | Performed by: NURSE PRACTITIONER

## 2018-05-02 PROCEDURE — PNV: Performed by: NURSE PRACTITIONER

## 2018-05-02 NOTE — PATIENT INSTRUCTIONS
Pregnancy at 31 to 34 Weeks   AMBULATORY CARE:   What changes are happening to your body: You may continue to have symptoms such as shortness of breath, heartburn, contractions, or swelling of your ankles and feet  You may be gaining about 1 pound a week now  Seek care immediately if:   · You develop a severe headache that does not go away  · You have new or increased vision changes, such as blurred or spotted vision  · You have new or increased swelling in your face or hands  · You have vaginal spotting or bleeding  · Your water broke or you feel warm water gushing or trickling from your vagina  Contact your healthcare provider if:   · You have more than 5 contractions in 1 hour  · You notice any changes in your baby's movements  · You have abdominal cramps, pressure, or tightening  · You have a change in vaginal discharge  · You have chills or a fever  · You have vaginal itching, burning, or pain  · You have yellow, green, white, or foul-smelling vaginal discharge  · You have pain or burning when you urinate, less urine than usual, or pink or bloody urine  · You have questions or concerns about your condition or care  How to care for yourself at this stage of your pregnancy:   · Eat a variety of healthy foods  Healthy foods include fruits, vegetables, whole-grain breads, low-fat dairy foods, beans, lean meats, and fish  Drink liquids as directed  Ask how much liquid to drink each day and which liquids are best for you  Limit caffeine to less than 200 milligrams each day  Limit your intake of fish to 2 servings each week  Choose fish low in mercury such as canned light tuna, shrimp, salmon, cod, or tilapia  Do not  eat fish high in mercury such as swordfish, tilefish, alfred mackerel, and shark  · Manage heartburn  by eating 4 or 5 small meals each day instead of large meals  Avoid spicy food  · Manage swelling  by lying down and putting your feet up       · Take prenatal vitamins as directed  Your need for certain vitamins and minerals, such as folic acid, increases during pregnancy  Prenatal vitamins provide some of the extra vitamins and minerals you need  Prenatal vitamins may also help to decrease the risk of certain birth defects  · Talk to your healthcare provider about exercise  Moderate exercise can help you stay fit  Your healthcare provider will help you plan an exercise program that is safe for you during pregnancy  · Do not smoke  If you smoke, it is never too late to quit  Smoking increases your risk of a miscarriage and other health problems during your pregnancy  Smoking can cause your baby to be born too early or weigh less at birth  Ask your healthcare provider for information if you need help quitting  · Do not drink alcohol  Alcohol passes from your body to your baby through the placenta  It can affect your baby's brain development and cause fetal alcohol syndrome (FAS)  FAS is a group of conditions that causes mental, behavior, and growth problems  · Talk to your healthcare provider before you take any medicines  Many medicines may harm your baby if you take them when you are pregnant  Do not take any medicines, vitamins, herbs, or supplements without first talking to your healthcare provider  Never use illegal or street drugs (such as marijuana or cocaine) while you are pregnant  Safety tips during pregnancy:   · Avoid hot tubs and saunas  Do not use a hot tub or sauna while you are pregnant, especially during your first trimester  Hot tubs and saunas may raise your baby's temperature and increase the risk of birth defects  · Avoid toxoplasmosis  This is an infection caused by eating raw meat or being around infected cat feces  It can cause birth defects, miscarriages, and other problems  Wash your hands after you touch raw meat  Make sure any meat is well-cooked before you eat it  Avoid raw eggs and unpasteurized milk   Use gloves or ask someone else to clean your cat's litter box while you are pregnant  Changes that are happening with your baby:  By 34 weeks, your baby may weigh more than 5 pounds  Your baby will be about 12 ½ inches long from the top of the head to the rump (baby's bottom)  Your baby is gaining about ½ pound a week  Your baby's eyes open and close now  Your baby's kicks and movements are more forceful at this time  What you need to know about prenatal care: Your healthcare provider will check your blood pressure and weight  You may also need the following:  · A urine test  may also be done to check for sugar and protein  These can be signs of gestational diabetes or infection  Protein in your urine may also be a sign of preeclampsia  Preeclampsia is a condition that can develop during week 20 or later of your pregnancy  It causes high blood pressure, and it can cause problems with your kidneys and other organs  · A Tdap vaccine  may be recommended by your healthcare provider  · Fundal height  is a measurement of your uterus to check your baby's growth  This number is usually the same as the number of weeks that you have been pregnant  Your healthcare provider may also check your baby's position  · Your baby's heart rate  will be checked  © 2017 [address] [employer]  Information is for End User's use only and may not be sold, redistributed or otherwise used for commercial purposes  All illustrations and images included in CareNotes® are the copyrighted property of [employer]  or Bryn Celeste  The above information is an  only  It is not intended as medical advice for individual conditions or treatments  Talk to your doctor, nurse or pharmacist before following any medical regimen to see if it is safe and effective for you

## 2018-05-02 NOTE — PROGRESS NOTES
Problem List Items Addressed This Visit     Supervision of normal pregnancy - Primary    31 weeks gestation of pregnancy    Anemia      Other Visit Diagnoses     Need for Tdap vaccination            Here with daughter  Feels well  Taking iron QD for hgb 10 5  Denies LOF/CTX/VB  No concerns  Reports good fetal movement  Tdap today

## 2018-05-16 ENCOUNTER — ROUTINE PRENATAL (OUTPATIENT)
Dept: OBGYN CLINIC | Age: 28
End: 2018-05-16

## 2018-05-16 VITALS — WEIGHT: 155.25 LBS | BODY MASS INDEX: 27.5 KG/M2 | SYSTOLIC BLOOD PRESSURE: 142 MMHG | DIASTOLIC BLOOD PRESSURE: 82 MMHG

## 2018-05-16 DIAGNOSIS — O13.3 PREGNANCY-INDUCED HYPERTENSION IN THIRD TRIMESTER: ICD-10-CM

## 2018-05-16 DIAGNOSIS — Z34.83 ENCOUNTER FOR SUPERVISION OF OTHER NORMAL PREGNANCY IN THIRD TRIMESTER: Primary | ICD-10-CM

## 2018-05-16 PROBLEM — Z3A.31 31 WEEKS GESTATION OF PREGNANCY: Status: RESOLVED | Noted: 2018-04-16 | Resolved: 2018-05-16

## 2018-05-16 PROCEDURE — PNV: Performed by: OBSTETRICS & GYNECOLOGY

## 2018-05-16 NOTE — Clinical Note
Her BP was elevated in the office  I didn't see it until today  I put in orders for blood work and a urine sample- not a 24 hour urine but protein/creatinine ratio which is done off of a urinalysis    Can you call Susana Irwin to see if she can do this before her next apt?

## 2018-05-17 NOTE — PATIENT INSTRUCTIONS
Preeclampsia   WHAT YOU NEED TO KNOW:   What is preeclampsia? Preeclampsia is a condition that can develop during week 20 or later of your pregnancy  Preeclampsia means you have high blood pressure and may have protein in your urine  Preeclampsia can cause mild to life-threatening health problems for you and your unborn baby  What are the signs and symptoms of preeclampsia? You may not have any symptoms  Severe preeclampsia may cause any of the following symptoms:  · Swollen face and hands    · A sudden weight gain of 5 pounds or more    · Headache    · Spotted or blurred vision     · Pain in your upper abdomen  What increases my risk for preeclampsia? · First pregnancy    · Pregnant with twins or multiples    · Personal or family history of preeclampsia or eclampsia    · Overweight    · Diabetes, high blood pressure, or kidney disease    · Age older than 40 years  How is preeclampsia diagnosed? · A blood pressure  of 140/90 mmHg or more for at least 2 readings may mean you have preeclampsia  Your blood pressure will need to be checked 1 to 2 times a week until your baby is born  · Blood tests  are done to check your liver and kidney function  You may need blood tests every week while you are pregnant  · Urine tests  are used to check for protein  You may need to give healthcare providers a urine sample at each visit  You may also need to collect your urine every time you urinate for 24 hours  How will my unborn baby be monitored? You may need to keep track of how often your baby moves or kicks over a certain amount of time  Ask your healthcare provider how to do kick counts and how often to do them  You may also need the following tests at each visit until your baby is born:  · A fetal biophysical profile  combines a nonstress test and an ultrasound of your unborn baby  The nonstress test measures changes in your baby's heartbeat when he moves   The ultrasound will show your baby's movement, growth, and how his breathing muscles are working  Healthcare providers can check the amount of fluid around your baby  The ultrasound will also show how well your baby's lungs are working  · An umbilical cord Doppler  checks blood flow through the umbilical cord  How is preeclampsia treated? · Medicines  may be given to lower your blood pressure, protect your organs, or prevent seizures  Low doses of aspirin after 12 weeks of pregnancy may be recommended if you are at high risk for preeclampsia  Aspirin may help prevent preeclampsia or problems that can happen from preeclampsia  Do not take aspirin unless directed by your healthcare provider  · Rest  as directed  Your healthcare provider may tell you to rest more often if you have mild symptoms of preeclampsia  Lie on your left side as often as you can  You may need complete bedrest if you have more severe symptoms  You may need to be in the hospital if your condition worsens  · Delivery  usually stops preeclampsia  Healthcare providers may deliver your baby right away if he is full-term (37 weeks or more)  He may need to be delivered early if you or the baby has life-threatening symptoms  What are the risks of preeclampsia? Your baby may not grow as he should and may need to be delivered early  Placental abruption can occur if the placenta pulls away from the uterus too soon  This condition is life-threatening for your baby  High blood pressure that is not controlled can lead to blood clots, kidney or liver failure, or stroke  Severe preeclampsia can cause seizures or coma  This condition is called eclampsia  Eclampsia is a life-threatening condition for you and your unborn baby  Call 911 for any of the following:   · You have a seizure  · You have severe abdominal pain with nausea and vomiting  When should I seek immediate care? · You develop a severe headache that does not go away      · You have blurred or spotted vision that does not go away     · You are bleeding from your vagina  · You have new or increased swelling in your face or hands  · You are urinating little or not at all  When should I contact my healthcare provider? · You are urinating less than usual      · You do not feel your baby's movements as often as usual     · You have questions or concerns about your condition or care  CARE AGREEMENT:   You have the right to help plan your care  Learn about your health condition and how it may be treated  Discuss treatment options with your caregivers to decide what care you want to receive  You always have the right to refuse treatment  The above information is an  only  It is not intended as medical advice for individual conditions or treatments  Talk to your doctor, nurse or pharmacist before following any medical regimen to see if it is safe and effective for you  © 2017 [address] [employer] Information is for End User's use only and may not be sold, redistributed or otherwise used for commercial purposes  All illustrations and images included in CareNotes® are the copyrighted property of [employer]  or [employer]

## 2018-05-17 NOTE — PROGRESS NOTES
33year old  @ 35w4d  Feeling well  Some fatigue  Has both children with her today in the office due to power outage at their school  BP slightly elevated today  No history of gestational hypertension or preeclampsia with other pregnancies  No headache, changes of vision, abdominal pain  Will send for labs and protein/creatinine ratio  Has appt scheduled for next week

## 2018-05-17 NOTE — PROGRESS NOTES
Just a quick FYI: Talked to pt she is going to go in the AM tomorrow 5/18/18 for this blood work as the [facility] is a fasting bloodwork

## 2018-05-18 ENCOUNTER — APPOINTMENT (OUTPATIENT)
Dept: LAB | Facility: CLINIC | Age: 28
End: 2018-05-18
Payer: COMMERCIAL

## 2018-05-18 ENCOUNTER — TELEPHONE (OUTPATIENT)
Dept: OBGYN CLINIC | Facility: CLINIC | Age: 28
End: 2018-05-18

## 2018-05-18 DIAGNOSIS — O13.3 PREGNANCY-INDUCED HYPERTENSION IN THIRD TRIMESTER: ICD-10-CM

## 2018-05-18 LAB
ALBUMIN SERPL BCP-MCNC: 2.5 G/DL (ref 3.5–5)
ALP SERPL-CCNC: 137 U/L (ref 46–116)
ALT SERPL W P-5'-P-CCNC: 14 U/L (ref 12–78)
ANION GAP SERPL CALCULATED.3IONS-SCNC: 7 MMOL/L (ref 4–13)
AST SERPL W P-5'-P-CCNC: 11 U/L (ref 5–45)
BACTERIA UR QL AUTO: ABNORMAL /HPF
BASOPHILS # BLD AUTO: 0.01 THOUSANDS/ΜL (ref 0–0.1)
BASOPHILS NFR BLD AUTO: 0 % (ref 0–1)
BILIRUB SERPL-MCNC: 0.26 MG/DL (ref 0.2–1)
BILIRUB UR QL STRIP: NEGATIVE
BUN SERPL-MCNC: 10 MG/DL (ref 5–25)
CALCIUM SERPL-MCNC: 8.3 MG/DL (ref 8.3–10.1)
CHLORIDE SERPL-SCNC: 108 MMOL/L (ref 100–108)
CLARITY UR: ABNORMAL
CO2 SERPL-SCNC: 22 MMOL/L (ref 21–32)
COLOR UR: YELLOW
CREAT SERPL-MCNC: 0.49 MG/DL (ref 0.6–1.3)
CREAT UR-MCNC: 59.8 MG/DL
EOSINOPHIL # BLD AUTO: 0.12 THOUSAND/ΜL (ref 0–0.61)
EOSINOPHIL NFR BLD AUTO: 1 % (ref 0–6)
ERYTHROCYTE [DISTWIDTH] IN BLOOD BY AUTOMATED COUNT: 15.2 % (ref 11.6–15.1)
GFR SERPL CREATININE-BSD FRML MDRD: 135 ML/MIN/1.73SQ M
GLUCOSE P FAST SERPL-MCNC: 84 MG/DL (ref 65–99)
GLUCOSE UR STRIP-MCNC: NEGATIVE MG/DL
HCT VFR BLD AUTO: 32.5 % (ref 34.8–46.1)
HGB BLD-MCNC: 10.2 G/DL (ref 11.5–15.4)
HGB UR QL STRIP.AUTO: NEGATIVE
KETONES UR STRIP-MCNC: ABNORMAL MG/DL
LEUKOCYTE ESTERASE UR QL STRIP: ABNORMAL
LYMPHOCYTES # BLD AUTO: 1.34 THOUSANDS/ΜL (ref 0.6–4.47)
LYMPHOCYTES NFR BLD AUTO: 13 % (ref 14–44)
MCH RBC QN AUTO: 28.3 PG (ref 26.8–34.3)
MCHC RBC AUTO-ENTMCNC: 31.4 G/DL (ref 31.4–37.4)
MCV RBC AUTO: 90 FL (ref 82–98)
MONOCYTES # BLD AUTO: 0.7 THOUSAND/ΜL (ref 0.17–1.22)
MONOCYTES NFR BLD AUTO: 7 % (ref 4–12)
NEUTROPHILS # BLD AUTO: 8.12 THOUSANDS/ΜL (ref 1.85–7.62)
NEUTS SEG NFR BLD AUTO: 79 % (ref 43–75)
NITRITE UR QL STRIP: NEGATIVE
NON-SQ EPI CELLS URNS QL MICRO: ABNORMAL /HPF
NRBC BLD AUTO-RTO: 0 /100 WBCS
PH UR STRIP.AUTO: 7 [PH] (ref 4.5–8)
PLATELET # BLD AUTO: 265 THOUSANDS/UL (ref 149–390)
PMV BLD AUTO: 11 FL (ref 8.9–12.7)
POTASSIUM SERPL-SCNC: 3.8 MMOL/L (ref 3.5–5.3)
PROT SERPL-MCNC: 6.5 G/DL (ref 6.4–8.2)
PROT UR STRIP-MCNC: NEGATIVE MG/DL
PROT UR-MCNC: 13 MG/DL
PROT/CREAT UR: 0.22 MG/G{CREAT} (ref 0–0.1)
RBC # BLD AUTO: 3.61 MILLION/UL (ref 3.81–5.12)
RBC #/AREA URNS AUTO: ABNORMAL /HPF
SODIUM SERPL-SCNC: 137 MMOL/L (ref 136–145)
SP GR UR STRIP.AUTO: 1.01 (ref 1–1.03)
UROBILINOGEN UR QL STRIP.AUTO: 0.2 E.U./DL
WBC # BLD AUTO: 10.32 THOUSAND/UL (ref 4.31–10.16)
WBC #/AREA URNS AUTO: ABNORMAL /HPF

## 2018-05-18 PROCEDURE — 84156 ASSAY OF PROTEIN URINE: CPT

## 2018-05-18 PROCEDURE — 85025 COMPLETE CBC W/AUTO DIFF WBC: CPT

## 2018-05-18 PROCEDURE — 82570 ASSAY OF URINE CREATININE: CPT

## 2018-05-18 PROCEDURE — 80053 COMPREHEN METABOLIC PANEL: CPT

## 2018-05-18 PROCEDURE — 36415 COLL VENOUS BLD VENIPUNCTURE: CPT

## 2018-05-18 PROCEDURE — 81001 URINALYSIS AUTO W/SCOPE: CPT

## 2018-05-18 NOTE — TELEPHONE ENCOUNTER
----- Message from Jennifer Alan MD sent at 5/18/2018 12:27 PM EDT -----  Please call Johnathan Stoll- her blood work is normal   Will re evaluate BP at next visit

## 2018-05-23 ENCOUNTER — ROUTINE PRENATAL (OUTPATIENT)
Dept: OBGYN CLINIC | Age: 28
End: 2018-05-23

## 2018-05-23 VITALS — WEIGHT: 156 LBS | SYSTOLIC BLOOD PRESSURE: 114 MMHG | BODY MASS INDEX: 27.63 KG/M2 | DIASTOLIC BLOOD PRESSURE: 62 MMHG

## 2018-05-23 DIAGNOSIS — Z34.93 THIRD TRIMESTER PREGNANCY: ICD-10-CM

## 2018-05-23 DIAGNOSIS — Z34.83 PRENATAL CARE, SUBSEQUENT PREGNANCY, THIRD TRIMESTER: Primary | ICD-10-CM

## 2018-05-23 PROBLEM — Z34.90 SUPERVISION OF NORMAL PREGNANCY: Status: RESOLVED | Noted: 2018-04-04 | Resolved: 2018-05-23

## 2018-05-23 PROCEDURE — PNV: Performed by: OBSTETRICS & GYNECOLOGY

## 2018-05-23 PROCEDURE — 87653 STREP B DNA AMP PROBE: CPT | Performed by: OBSTETRICS & GYNECOLOGY

## 2018-05-23 PROCEDURE — 87184 SC STD DISK METHOD PER PLATE: CPT | Performed by: OBSTETRICS & GYNECOLOGY

## 2018-05-23 NOTE — PROGRESS NOTES
Problem List Items Addressed This Visit        Other    Prenatal care, subsequent pregnancy, third trimester      Blood pressure today is normal   had normal preeclamptic labs   has no complaints   last weeks of pregnancy paper was given   will take some iron tablets due to slightly low hemoglobin  count           Other Visit Diagnoses     Third trimester pregnancy    -  Primary    Relevant Orders    Strep B DNA probe, amplification

## 2018-05-23 NOTE — ASSESSMENT & PLAN NOTE
Blood pressure today is normal   had normal preeclamptic labs   has no complaints   last weeks of pregnancy paper was given   will take some iron tablets due to slightly low hemoglobin  count

## 2018-05-26 LAB
GP B STREP DNA SPEC QL NAA+PROBE: ABNORMAL
GP B STREP DNA SPEC QL NAA+PROBE: ABNORMAL

## 2018-05-30 ENCOUNTER — ROUTINE PRENATAL (OUTPATIENT)
Dept: OBGYN CLINIC | Age: 28
End: 2018-05-30

## 2018-05-30 ENCOUNTER — HOSPITAL ENCOUNTER (OUTPATIENT)
Facility: HOSPITAL | Age: 28
Discharge: HOME | End: 2018-05-30
Attending: OBSTETRICS & GYNECOLOGY | Admitting: OBSTETRICS & GYNECOLOGY
Payer: COMMERCIAL

## 2018-05-30 VITALS
HEART RATE: 73 BPM | WEIGHT: 155 LBS | TEMPERATURE: 97.9 F | SYSTOLIC BLOOD PRESSURE: 123 MMHG | DIASTOLIC BLOOD PRESSURE: 78 MMHG | RESPIRATION RATE: 18 BRPM | BODY MASS INDEX: 27.46 KG/M2 | HEIGHT: 63 IN

## 2018-05-30 VITALS — SYSTOLIC BLOOD PRESSURE: 110 MMHG | WEIGHT: 153 LBS | DIASTOLIC BLOOD PRESSURE: 62 MMHG | BODY MASS INDEX: 27.1 KG/M2

## 2018-05-30 DIAGNOSIS — Z3A.37 37 WEEKS GESTATION OF PREGNANCY: ICD-10-CM

## 2018-05-30 DIAGNOSIS — Z34.83 ENCOUNTER FOR SUPERVISION OF OTHER NORMAL PREGNANCY IN THIRD TRIMESTER: Primary | ICD-10-CM

## 2018-05-30 DIAGNOSIS — D64.9 ANEMIA, UNSPECIFIED TYPE: ICD-10-CM

## 2018-05-30 PROBLEM — Z34.90 SUPERVISION OF NORMAL PREGNANCY: Status: ACTIVE | Noted: 2018-05-30

## 2018-05-30 PROCEDURE — PNV: Performed by: NURSE PRACTITIONER

## 2018-05-30 PROCEDURE — G0463 HOSPITAL OUTPT CLINIC VISIT: HCPCS

## 2018-05-30 PROCEDURE — 99214 OFFICE O/P EST MOD 30 MIN: CPT

## 2018-05-30 NOTE — PROGRESS NOTES
Feels well but tired  Went to [facility] last night for false labor  Denies LOF/CTX/VB  No further concerns

## 2018-05-30 NOTE — PROGRESS NOTES
L&D Triage Note - OB/GYN  Frederic Massey 33 y o  female MRN: [id number]  Unit/Bed#: LD Triage  Encounter: [id number]      SUBJECTIVE:  Frederic Massey 33 y o  [place] at 37w4d complaining of contractions that started last night at 1900, states that they are occurring every 2-3 minutes and strong intensity  Denies any leakage of fluid, vaginal bleeding, decreased fetal movement at this time  Denies dysuria, fever, chills, flank pain, changes in urinary/bowel function, nausea, vomiting, or diarrhea  OBJECTIVE:  Vitals:    18 0117   BP: 123/78   Pulse: 73   Resp: 18   Temp: 97 9 °F (36 6 °C)       SVE: 2 / 70% / -2 ; same dilation, effacement, and station on 3 hour recheck    FHT:  110bpm / Moderate 6 - 25 bpm / reactive, no decels  Senath: q 5 min      ASSESSMENT:  Frederic Massey 27 y o   at 37w4d complaining of contractions, 3 hour recheck there is no cervical change  Patient reports that contractions have lessened intensity at that time  PLAN:  1  Continue routine prenatal care, recommended increased p o  hydration, Tylenol, hot showers, heating pads, and massage  2  Discharge from [facility] triage, with labor precautions instructed to call if she starts to experience regular contractions of increasing frequency and intensity, vaginal bleeding, leakage of fluid, or decreased fetal movement  3   Case discussed with Dr Petrona Ahn MD  OB/GYN PGY-2  2018 3:55 AM

## 2018-05-30 NOTE — PATIENT INSTRUCTIONS
Pregnancy at 28 to 38 Weeks   AMBULATORY CARE:   What changes are happening to your body: You are considered full term at the beginning of 37 weeks  Your breathing may be easier if your baby has moved down into a head-down position  You may need to urinate more often because the baby may be pressing on your bladder  You may also feel more discomfort and get tired easily  Seek care immediately if:   · You develop a severe headache that does not go away  · You have new or increased vision changes, such as blurred or spotted vision  · You have new or increased swelling in your face or hands  · You have vaginal spotting or bleeding  · Your water broke or you feel warm water gushing or trickling from your vagina  Contact your healthcare provider if:   · You have more than 5 contractions in 1 hour  · You notice any changes in your baby's movements  · You have abdominal cramps, pressure, or tightening  · You have a change in vaginal discharge  · You have chills or a fever  · You have vaginal itching, burning, or pain  · You have yellow, green, white, or foul-smelling vaginal discharge  · You have pain or burning when you urinate, less urine than usual, or pink or bloody urine  · You have questions or concerns about your condition or care  How to care for yourself at this stage of your pregnancy:   · Eat a variety of healthy foods  Healthy foods include fruits, vegetables, whole-grain breads, low-fat dairy foods, beans, lean meats, and fish  Drink liquids as directed  Ask how much liquid to drink each day and which liquids are best for you  Limit caffeine to less than 200 milligrams each day  Limit your intake of fish to 2 servings each week  Choose fish low in mercury such as canned light tuna, shrimp, crab, salmon, cod, or tilapia  Do not  eat fish high in mercury such as swordfish, tilefish, alfred mackerel, and shark  · Take prenatal vitamins as directed    Your need for certain vitamins and minerals, such as folic acid, increases during pregnancy  Prenatal vitamins provide some of the extra vitamins and minerals you need  Prenatal vitamins may also help to decrease the risk of certain birth defects  · Rest as needed  Put your feet up if you have swelling in your ankles and feet  · Do not smoke  If you smoke, it is never too late to quit  Smoking increases your risk of a miscarriage and other health problems during your pregnancy  Smoking can cause your baby to be born too early or weigh less at birth  Ask your healthcare provider for information if you need help quitting  · Do not drink alcohol  Alcohol passes from your body to your baby through the placenta  It can affect your baby's brain development and cause fetal alcohol syndrome (FAS)  FAS is a group of conditions that causes mental, behavior, and growth problems  · Talk to your healthcare provider before you take any medicines  Many medicines may harm your baby if you take them when you are pregnant  Do not take any medicines, vitamins, herbs, or supplements without first talking to your healthcare provider  Never use illegal or street drugs (such as marijuana or cocaine) while you are pregnant  · Talk to your healthcare provider before you travel  You may not be able to travel in an airplane after 36 weeks  He may also recommend that you avoid long road trips  Safety tips during pregnancy:   · Avoid hot tubs and saunas  Do not use a hot tub or sauna while you are pregnant, especially during your first trimester  Hot tubs and saunas may raise your baby's temperature and increase the risk of birth defects  · Avoid toxoplasmosis  This is an infection caused by eating raw meat or being around infected cat feces  It can cause birth defects, miscarriages, and other problems  Wash your hands after you touch raw meat  Make sure any meat is well-cooked before you eat it   Avoid raw eggs and unpasteurized milk  Use gloves or ask someone else to clean your cat's litter box while you are pregnant  · Ask your healthcare provider about travel  The most comfortable time to travel is during the second trimester  Ask your healthcare provider if you can travel after 36 weeks  You may not be able to travel in an airplane after 36 weeks  He may also recommend that you avoid long road trips  Changes that are happening with your baby:  By 38 weeks, your baby may weigh between 6 and 9 pounds  Your baby may be about 14 inches long from the top of the head to the rump (baby's bottom)  Your baby hears well enough to know your voice  As your baby gets larger, you may feel fewer kicks and more stretching and rolling  Your baby may move into a head-down position  Your baby will also rest lower in your abdomen  What you need to know about prenatal care: Your healthcare provider will check your blood pressure and weight  You may also need the following:  · A urine test  may also be done to check for sugar and protein  These can be signs of gestational diabetes or infection  Protein in your urine may also be a sign of preeclampsia  Preeclampsia is a condition that can develop during week 20 or later of your pregnancy  It causes high blood pressure, and it can cause problems with your kidneys and other organs  · A blood test  may be done to check for anemia (low iron level)  · A Tdap vaccine  may be recommended by your healthcare provider  · A group B strep test  is a test that is done to check for group B strep infection  Group B strep is a type of bacteria that may be found in the vagina or rectum  It can be passed to your baby during delivery if you have it  Your healthcare provider will take swab your vagina or rectum and send the sample to the lab for tests  · Fundal height  is a measurement of your uterus to check your baby's growth   This number is usually the same as the number of weeks that you have been pregnant  Your healthcare provider may also check your baby's position  · Your baby's heart rate  will be checked  © 2017 [employer] Information is for End User's use only and may not be sold, redistributed or otherwise used for commercial purposes  All illustrations and images included in CareNotes® are the copyrighted property of [employer]  or [employer]  The above information is an  only  It is not intended as medical advice for individual conditions or treatments  Talk to your doctor, nurse or pharmacist before following any medical regimen to see if it is safe and effective for you

## 2018-06-04 ENCOUNTER — TELEPHONE (OUTPATIENT)
Dept: OBGYN CLINIC | Facility: CLINIC | Age: 28
End: 2018-06-04

## 2018-06-04 ENCOUNTER — TELEPHONE (OUTPATIENT)
Dept: OBGYN CLINIC | Age: 28
End: 2018-06-04

## 2018-06-04 NOTE — TELEPHONE ENCOUNTER
Pt is looking to schedule induction prior to her appt on Thursday with Dr Ericka Quintana  Please advise  Pt left message on OB line

## 2018-06-04 NOTE — TELEPHONE ENCOUNTER
Dear Rowan Pineda:    Good evening  Pt called office requesting to be scheduled for an IOL  Pt saw you on 18  Pt's YONNY is 18, GA is 38 wks 2 days,  3 Para 2  Pt is positive for Strep Group B  Pt states she wants to schedule an elective IOL before her appointment with Dr Clari Cummings on 18  Pt had recent ER visit on 18, spoke with Dr Kristi Tracey  Pt further states that she informed Dr Kristi Tracey that her pregnancies "progress" very fast, and that she is traveling from [place], [place]  Pt is aware that she may receive response from you tomorrow  Please advise  Thank you!     Maverick Alcaraz MA

## 2018-06-05 NOTE — TELEPHONE ENCOUNTER
Patient is aware that elective inductions are on hold at this time on L&D  Advised pt to discuss it with G87 on Thursday

## 2018-06-05 NOTE — TELEPHONE ENCOUNTER
There is a hold for elective inductions on L+D so she can discuss with Dr Stevenson Hitchcock in 2 days, thx

## 2018-06-07 ENCOUNTER — ROUTINE PRENATAL (OUTPATIENT)
Dept: OBGYN CLINIC | Facility: MEDICAL CENTER | Age: 28
End: 2018-06-07

## 2018-06-07 VITALS
BODY MASS INDEX: 27.68 KG/M2 | SYSTOLIC BLOOD PRESSURE: 126 MMHG | HEIGHT: 63 IN | DIASTOLIC BLOOD PRESSURE: 78 MMHG | RESPIRATION RATE: 14 BRPM | WEIGHT: 156.2 LBS

## 2018-06-07 DIAGNOSIS — Z3A.38 38 WEEKS GESTATION OF PREGNANCY: Primary | ICD-10-CM

## 2018-06-07 PROCEDURE — PNV: Performed by: OBSTETRICS & GYNECOLOGY

## 2018-06-07 NOTE — PROGRESS NOTES
Patient is a 50-year-old female para 2 at 30 weeks and 5 days here for routine prenatal visit without complaint  Review of systems is positive for fetal movement negative for loss of fluid vaginal bleeding or regular contractions  On physical exam patient has a large umbilical hernia  No symptoms or physical evidence of incarceration  Vertex by limited office ultrasound

## 2018-06-12 ENCOUNTER — HOSPITAL ENCOUNTER (INPATIENT)
Facility: HOSPITAL | Age: 28
LOS: 3 days | Discharge: HOME | End: 2018-06-15
Attending: OBSTETRICS & GYNECOLOGY | Admitting: OBSTETRICS & GYNECOLOGY
Payer: COMMERCIAL

## 2018-06-12 ENCOUNTER — ANESTHESIA EVENT (INPATIENT)
Dept: LABOR AND DELIVERY | Facility: HOSPITAL | Age: 28
End: 2018-06-12
Payer: COMMERCIAL

## 2018-06-12 ENCOUNTER — ANESTHESIA (INPATIENT)
Dept: LABOR AND DELIVERY | Facility: HOSPITAL | Age: 28
End: 2018-06-12
Payer: COMMERCIAL

## 2018-06-12 DIAGNOSIS — Z3A.39 39 WEEKS GESTATION OF PREGNANCY: Primary | ICD-10-CM

## 2018-06-12 LAB
BASOPHILS # BLD AUTO: 0.03 THOUSANDS/ΜL (ref 0–0.1)
BASOPHILS NFR BLD AUTO: 0 % (ref 0–1)
EOSINOPHIL # BLD AUTO: 0.1 THOUSAND/ΜL (ref 0–0.61)
EOSINOPHIL NFR BLD AUTO: 1 % (ref 0–6)
ERYTHROCYTE [DISTWIDTH] IN BLOOD BY AUTOMATED COUNT: 16.2 % (ref 11.6–15.1)
HCT VFR BLD AUTO: 33.3 % (ref 34.8–46.1)
HGB BLD-MCNC: 10.9 G/DL (ref 11.5–15.4)
IMM GRANULOCYTES # BLD AUTO: 0.04 THOUSAND/UL (ref 0–0.2)
IMM GRANULOCYTES NFR BLD AUTO: 0 % (ref 0–2)
LYMPHOCYTES # BLD AUTO: 3.9 THOUSANDS/ΜL (ref 0.6–4.47)
LYMPHOCYTES NFR BLD AUTO: 33 % (ref 14–44)
MCH RBC QN AUTO: 27.8 PG (ref 26.8–34.3)
MCHC RBC AUTO-ENTMCNC: 32.7 G/DL (ref 31.4–37.4)
MCV RBC AUTO: 85 FL (ref 82–98)
MONOCYTES # BLD AUTO: 0.95 THOUSAND/ΜL (ref 0.17–1.22)
MONOCYTES NFR BLD AUTO: 8 % (ref 4–12)
NEUTROPHILS # BLD AUTO: 6.76 THOUSANDS/ΜL (ref 1.85–7.62)
NEUTS SEG NFR BLD AUTO: 58 % (ref 43–75)
NRBC BLD AUTO-RTO: 0 /100 WBCS
PLATELET # BLD AUTO: 279 THOUSANDS/UL (ref 149–390)
PMV BLD AUTO: 11.2 FL (ref 8.9–12.7)
RBC # BLD AUTO: 3.92 MILLION/UL (ref 3.81–5.12)
WBC # BLD AUTO: 11.78 THOUSAND/UL (ref 4.31–10.16)

## 2018-06-12 PROCEDURE — 86901 BLOOD TYPING SEROLOGIC RH(D): CPT | Performed by: OBSTETRICS & GYNECOLOGY

## 2018-06-12 PROCEDURE — 86900 BLOOD TYPING SEROLOGIC ABO: CPT | Performed by: OBSTETRICS & GYNECOLOGY

## 2018-06-12 PROCEDURE — 86592 SYPHILIS TEST NON-TREP QUAL: CPT | Performed by: OBSTETRICS & GYNECOLOGY

## 2018-06-12 PROCEDURE — 86850 RBC ANTIBODY SCREEN: CPT | Performed by: OBSTETRICS & GYNECOLOGY

## 2018-06-12 PROCEDURE — 85025 COMPLETE CBC W/AUTO DIFF WBC: CPT | Performed by: OBSTETRICS & GYNECOLOGY

## 2018-06-12 PROCEDURE — 99203 OFFICE O/P NEW LOW 30 MIN: CPT

## 2018-06-12 PROCEDURE — G0463 HOSPITAL OUTPT CLINIC VISIT: HCPCS

## 2018-06-12 RX ORDER — CLINDAMYCIN PHOSPHATE 900 MG/50ML
900 INJECTION INTRAVENOUS EVERY 8 HOURS
Status: DISCONTINUED | OUTPATIENT
Start: 2018-06-12 | End: 2018-06-13

## 2018-06-12 RX ORDER — ALBUTEROL SULFATE 2.5 MG/3ML
2.5 SOLUTION RESPIRATORY (INHALATION) EVERY 4 HOURS PRN
Status: DISCONTINUED | OUTPATIENT
Start: 2018-06-12 | End: 2018-06-13

## 2018-06-12 RX ORDER — SODIUM CHLORIDE, SODIUM LACTATE, POTASSIUM CHLORIDE, CALCIUM CHLORIDE 600; 310; 30; 20 MG/100ML; MG/100ML; MG/100ML; MG/100ML
125 INJECTION, SOLUTION INTRAVENOUS CONTINUOUS
Status: DISCONTINUED | OUTPATIENT
Start: 2018-06-12 | End: 2018-06-13

## 2018-06-12 RX ADMIN — SODIUM CHLORIDE, SODIUM LACTATE, POTASSIUM CHLORIDE, AND CALCIUM CHLORIDE 125 ML/HR: .6; .31; .03; .02 INJECTION, SOLUTION INTRAVENOUS at 23:40

## 2018-06-12 RX ADMIN — SODIUM CHLORIDE, SODIUM LACTATE, POTASSIUM CHLORIDE, AND CALCIUM CHLORIDE 999 ML/HR: .6; .31; .03; .02 INJECTION, SOLUTION INTRAVENOUS at 23:20

## 2018-06-12 RX ADMIN — CLINDAMYCIN PHOSPHATE 900 MG: 18 INJECTION, SOLUTION INTRAMUSCULAR; INTRAVENOUS at 23:40

## 2018-06-13 LAB
ABO GROUP BLD: NORMAL
BASE EXCESS BLDCOA CALC-SCNC: -3.7 MMOL/L (ref 3–11)
BASE EXCESS BLDCOV CALC-SCNC: -2.8 MMOL/L (ref 1–9)
BLD GP AB SCN SERPL QL: NEGATIVE
HCO3 BLDCOA-SCNC: 24.2 MMOL/L (ref 17.3–27.3)
HCO3 BLDCOV-SCNC: 21.4 MMOL/L (ref 12.2–28.6)
O2 CT VFR BLDCOA CALC: 6.5 ML/DL
OXYHGB MFR BLDCOA: 31.2 %
OXYHGB MFR BLDCOV: 75.5 %
PCO2 BLDCOA: 55.2 MM[HG] (ref 30–60)
PCO2 BLDCOV: 35.9 MM HG (ref 27–43)
PH BLDCOA: 7.26 [PH] (ref 7.23–7.43)
PH BLDCOV: 7.39 [PH] (ref 7.19–7.49)
PO2 BLDCOA: 18.7 MM HG (ref 5–25)
PO2 BLDCOV: 34.7 MM HG (ref 15–45)
RH BLD: POSITIVE
RPR SER QL: NORMAL
SAO2 % BLDCOV: 14.8 ML/DL
SPECIMEN EXPIRATION DATE: NORMAL

## 2018-06-13 PROCEDURE — 59400 OBSTETRICAL CARE: CPT | Performed by: OBSTETRICS & GYNECOLOGY

## 2018-06-13 PROCEDURE — 82805 BLOOD GASES W/O2 SATURATION: CPT | Performed by: OBSTETRICS & GYNECOLOGY

## 2018-06-13 RX ORDER — IBUPROFEN 600 MG/1
600 TABLET ORAL EVERY 6 HOURS PRN
Status: DISCONTINUED | OUTPATIENT
Start: 2018-06-13 | End: 2018-06-15 | Stop reason: HOSPADM

## 2018-06-13 RX ORDER — DIPHENHYDRAMINE HCL 25 MG
25 TABLET ORAL EVERY 6 HOURS PRN
Status: DISCONTINUED | OUTPATIENT
Start: 2018-06-13 | End: 2018-06-15 | Stop reason: HOSPADM

## 2018-06-13 RX ORDER — DOCUSATE SODIUM 100 MG/1
100 CAPSULE, LIQUID FILLED ORAL 2 TIMES DAILY
Status: DISCONTINUED | OUTPATIENT
Start: 2018-06-13 | End: 2018-06-15 | Stop reason: HOSPADM

## 2018-06-13 RX ORDER — CALCIUM CARBONATE 200(500)MG
1000 TABLET,CHEWABLE ORAL DAILY PRN
Status: DISCONTINUED | OUTPATIENT
Start: 2018-06-13 | End: 2018-06-15 | Stop reason: HOSPADM

## 2018-06-13 RX ORDER — OXYTOCIN/RINGER'S LACTATE 30/500 ML
PLASTIC BAG, INJECTION (ML) INTRAVENOUS
Status: COMPLETED
Start: 2018-06-13 | End: 2018-06-13

## 2018-06-13 RX ORDER — OXYCODONE HYDROCHLORIDE AND ACETAMINOPHEN 5; 325 MG/1; MG/1
1 TABLET ORAL EVERY 4 HOURS PRN
Status: DISCONTINUED | OUTPATIENT
Start: 2018-06-13 | End: 2018-06-15 | Stop reason: HOSPADM

## 2018-06-13 RX ORDER — ROPIVACAINE HYDROCHLORIDE 2 MG/ML
INJECTION, SOLUTION EPIDURAL; INFILTRATION; PERINEURAL AS NEEDED
Status: DISCONTINUED | OUTPATIENT
Start: 2018-06-13 | End: 2018-06-13 | Stop reason: SURG

## 2018-06-13 RX ORDER — DIAPER,BRIEF,INFANT-TODD,DISP
1 EACH MISCELLANEOUS AS NEEDED
Status: DISCONTINUED | OUTPATIENT
Start: 2018-06-13 | End: 2018-06-15 | Stop reason: HOSPADM

## 2018-06-13 RX ORDER — ACETAMINOPHEN 325 MG/1
650 TABLET ORAL EVERY 6 HOURS PRN
Status: DISCONTINUED | OUTPATIENT
Start: 2018-06-13 | End: 2018-06-15 | Stop reason: HOSPADM

## 2018-06-13 RX ORDER — ONDANSETRON 2 MG/ML
4 INJECTION INTRAMUSCULAR; INTRAVENOUS EVERY 8 HOURS PRN
Status: DISCONTINUED | OUTPATIENT
Start: 2018-06-13 | End: 2018-06-15 | Stop reason: HOSPADM

## 2018-06-13 RX ORDER — ONDANSETRON 2 MG/ML
4 INJECTION INTRAMUSCULAR; INTRAVENOUS EVERY 6 HOURS PRN
Status: DISCONTINUED | OUTPATIENT
Start: 2018-06-13 | End: 2018-06-13

## 2018-06-13 RX ORDER — OXYTOCIN/RINGER'S LACTATE 30/500 ML
250 PLASTIC BAG, INJECTION (ML) INTRAVENOUS ONCE
Status: COMPLETED | OUTPATIENT
Start: 2018-06-13 | End: 2018-06-13

## 2018-06-13 RX ADMIN — Medication 250 MILLI-UNITS/MIN: at 02:01

## 2018-06-13 RX ADMIN — WITCH HAZEL 1 PAD: 500 SOLUTION RECTAL; TOPICAL at 03:43

## 2018-06-13 RX ADMIN — ROPIVACAINE HYDROCHLORIDE 3 ML: 2 INJECTION, SOLUTION EPIDURAL; INFILTRATION at 00:16

## 2018-06-13 RX ADMIN — ROPIVACAINE HYDROCHLORIDE 3 ML: 2 INJECTION, SOLUTION EPIDURAL; INFILTRATION at 00:22

## 2018-06-13 RX ADMIN — ONDANSETRON 4 MG: 2 INJECTION INTRAMUSCULAR; INTRAVENOUS at 00:20

## 2018-06-13 RX ADMIN — IBUPROFEN 600 MG: 600 TABLET ORAL at 04:14

## 2018-06-13 RX ADMIN — HYDROCORTISONE 1 APPLICATION: 1 CREAM TOPICAL at 03:43

## 2018-06-13 RX ADMIN — DOCUSATE SODIUM 100 MG: 100 CAPSULE, LIQUID FILLED ORAL at 13:53

## 2018-06-13 RX ADMIN — BENZOCAINE AND LEVOMENTHOL: 200; 5 SPRAY TOPICAL at 03:43

## 2018-06-13 RX ADMIN — OXYCODONE HYDROCHLORIDE AND ACETAMINOPHEN 1 TABLET: 5; 325 TABLET ORAL at 04:13

## 2018-06-13 RX ADMIN — IBUPROFEN 600 MG: 600 TABLET ORAL at 13:53

## 2018-06-13 RX ADMIN — ROPIVACAINE HYDROCHLORIDE: 2 INJECTION, SOLUTION EPIDURAL; INFILTRATION at 00:22

## 2018-06-13 NOTE — ANESTHESIA PREPROCEDURE EVALUATION
Review of Systems/Medical History  Patient summary reviewed  Chart reviewed  No history of anesthetic complications     Cardiovascular  Exercise tolerance (METS): >4,     Pulmonary  Asthma , PRN med  controlled Last rescue: < 6 months ago Asthma type of rescue: nebulizers,        GI/Hepatic            Endo/Other     GYN  Currently pregnant ,          Hematology  Anemia ,     Musculoskeletal       Neurology    Headaches,    Psychology           Physical Exam    Airway    Mallampati score: III  TM Distance: >3 FB  Neck ROM: full     Dental   No notable dental hx     Cardiovascular      Pulmonary      Other Findings        Anesthesia Plan  ASA Score- 2     Anesthesia Type- epidural with ASA Monitors  Additional Monitors:   Airway Plan:         Plan Factors-    Induction-     Postoperative Plan-     Informed Consent- Anesthetic plan and risks discussed with patient and spouse

## 2018-06-13 NOTE — L&D DELIVERY NOTE
DELIVERY NOTE  Mu Garcia 33 y o  female MRN: [id number]  Unit/Bed#:  303-01 Encounter: [id number]    Obstetrician:   Donato Reno    Assistant: Lucia Ford    Pre-Delivery Diagnosis:  Pregnancy at 39w4d  Active labor  GBS positive    Post-Delivery Diagnosis: Same as above - Delivered    Procedure: Spontaneous vaginal delivery    Estimated Blood Loss:  175            Complications:  None    Description of Delivery: Patient delivered a viable Male  over intact perineum on 18 at [address]  A nuchal cord was not noted  With the assistance of maternal expulsive efforts and downward traction of fetal head, the anterior shoulder (fetal right) was delivered without difficulty, followed by the remainder of the infant's body  After delivery of the , the umbilical cord was doubly clamped and cut and the  was passed off to  staff for routine care  Umbilical cord blood and umbilical artery and venous gases were collected  Placenta was delivered with fundal massage and gentle traction on the cord with active management of the third stage of labor  Placenta delivered intact with a 3-vessel cord  Active management of the third stage of labor was undertaken with IV pitocin  Bleeding was noted to be under control  The uterus was noted to be firm, palpated 1cm below the umbilicus  Inspection of the perineum, vagina, labia, and urethra revealed a small periurethral abrasion which was hemostatic and did not require repair  Mother and baby are currently recovering nicely in stable condition      APGARS: 9 at one minute, 9 at 5 minutes  Blood gases: Arterial pH:7 259, Base excess: -3 7 ; Venous pH:7 394, Base excess: -2 8

## 2018-06-13 NOTE — PROGRESS NOTES
Progress Note - OB/GYN   Miya Bailey 33 y o  female MRN: [id number]  Unit/Bed#: -01 Encounter: [id number]    Assessment:  39 weeks gestation of pregnancy     Plan:  Postpartum day # 0   Ambulation  Pain control  Postpartum care    Subjective/Objective   Chief Complaint: Feels well    Subjective: The patient is not having any pain  Eating a regular diet without difficulty  Flatus Yes   Bowel movements are not yet occurred  Voiding without difficulty  Ambulating Yes  Breastfeeding Yes  Lochia normal     Objective:    Vitals: Blood pressure 118/68, pulse 64, temperature 97 6 °F (36 4 °C), temperature source Oral, resp  rate 20, height 5' 3" (1 6 m), weight 70 8 kg (156 lb), SpO2 94 %, currently breastfeeding  ,Body mass index is 27 63 kg/m²  Intake/Output Summary (Last 24 hours) at 18 1007  Last data filed at 18 0901   Gross per 24 hour   Intake          1291 67 ml   Output             1400 ml   Net          -108 33 ml       Invasive Devices     Peripheral Intravenous Line            Peripheral IV 18 Right Hand less than 1 day                Physical Exam:   Physical Exam   Constitutional: She appears well-developed and well-nourished  Cardiovascular: Normal rate, regular rhythm and normal heart sounds  Pulmonary/Chest: Effort normal and breath sounds normal    Abdominal: Soft  Bowel sounds are normal  She exhibits no distension  There is no tenderness  Lab, Imaging and other studies: I have personally reviewed pertinent reports

## 2018-06-13 NOTE — PLAN OF CARE
DISCHARGE PLANNING     Discharge to home or other facility with appropriate resources Progressing        INFECTION - ADULT     Absence or prevention of progression during hospitalization Progressing     Absence of fever/infection during neutropenic period Progressing        Knowledge Deficit     Verbalizes understanding of labor plan Progressing     Patient/family/caregiver demonstrates understanding of disease process, treatment plan, medications, and discharge instructions Progressing        Labor & Delivery     Manages discomfort Progressing     Patient vital signs are stable Progressing        PAIN - ADULT     Verbalizes/displays adequate comfort level or baseline comfort level Progressing        POSTPARTUM     Experiences normal postpartum course Progressing     Appropriate maternal -  bonding Progressing     Establishment of infant feeding pattern Progressing     Incision(s), wounds(s) or drain site(s) healing without S/S of infection Progressing        SAFETY ADULT     Patient will remain free of falls Progressing     Maintain or return to baseline ADL function Progressing     Maintain or return mobility status to optimal level Progressing

## 2018-06-13 NOTE — DISCHARGE INSTRUCTIONS
Vaginal Delivery   WHAT YOU NEED TO KNOW:   A vaginal delivery occurs when your baby is born through your vagina (birth canal)  DISCHARGE INSTRUCTIONS:   Seek care immediately if:   · Your leg feels warm, tender, and painful  It may look swollen and red  · You have a fever  · You are urinating very little, or not at all  · You have heavy vaginal bleeding that fills 1 or more sanitary pads in 1 hour  · You feel weak, dizzy, or faint  Contact your healthcare provider if:   · Your abdominal or perineal pain does not go away, or gets worse  · You feel depressed  · You have questions or concerns about your condition or care  Medicines:  · NSAIDs , such as ibuprofen, help decrease swelling, pain, and fever  This medicine is available with or without a doctor's order  NSAIDs can cause stomach bleeding or kidney problems in certain people  If you take blood thinner medicine, always ask your healthcare provider if NSAIDs are safe for you  Always read the medicine label and follow directions  · Stool softeners  make it easier for you to have a bowel movement  You may need this medicine to treat or prevent constipation  · Take your medicine as directed  Contact your healthcare provider if you think your medicine is not helping or if you have side effects  Tell him or her if you are allergic to any medicine  Keep a list of the medicines, vitamins, and herbs you take  Include the amounts, and when and why you take them  Bring the list or the pill bottles to follow-up visits  Carry your medicine list with you in case of an emergency  Follow up with your healthcare provider:  Most women need to return 6 weeks after a vaginal delivery  Ask your healthcare provider how to care for your wounds or stitches, if you have them  Write down your questions so you remember to ask them during your visits  Activity:  Rest as much as possible  Try to keep all activities short   You may be able to do some exercise soon after you have your baby  Talk with your healthcare provider before you start exercising  If you work outside the home, ask when you can return to your job  Kegel exercises:  Kegel exercises may help your vaginal and rectal muscles heal faster  You can do Kegel exercises by tightening and relaxing the muscles around your vagina  Kegel exercises help make the muscles stronger  Breast care:  When your milk comes in, your breasts may feel full and hard  Ask how to care for your breasts, even if you are not breastfeeding  Constipation:  You may have constipation for a period of time after you have your baby  Do not try to push the bowel movement out if it is too hard  High-fiber foods and extra liquids can help you prevent constipation  Examples of high-fiber foods are fruit and bran  Prune juice and water are good liquids to drink  You may also be told to take over-the-counter fiber and stool softener medicines  Take these items as directed  Ask how to prevent or treat hemorrhoids  Perineum care: Your perineum is the area between your vagina and anus  Keep the area clean and dry  This will help it heal and prevent infection  Wash the area gently with soap and water when you bathe or shower  Rinse your perineum with warm water after you urinate or have a bowel movement  Your healthcare provider may suggest you use a warm sitz bath to help decrease pain  To take a sitz bath, fill a bathtub with 4 to 6 inches of warm water  You may also use a sitz bath pan that fits inside the toilet  Sit in the sitz bath for 20 minutes  Do this 2 to 3 times a day, or as directed  The warm water can help decrease pain and swelling  Vaginal discharge: You will have vaginal discharge, called lochia, after your delivery  The lochia is red or dark brown with clots for 1 to 3 days after the birth  The amount will decrease and turn pale pink or brown for 3 to 10 days   It will turn white or yellow on the 10th or 14th day  Lochia is usually gone within 3 weeks  Use a sanitary pad rather than a tampon to prevent a vaginal infection  You will have lochia for up to 3 weeks after your baby is born  Monthly periods: Your period may start again within 7 to 9 weeks after your baby is born  If you are breastfeeding, it may take longer for your period to start again  You can still get pregnant again even though you do not have your monthly period  Talk with your healthcare provider about a birth control method if you do not want to get pregnant  Mood changes: Many new mothers have some kind of mood changes after delivery  Some of these changes occur because of lack of sleep, hormone changes, and caring for a new baby  Some mood changes can be more serious, such as postpartum depression  Talk with your healthcare provider if you feel unable to care for yourself or your baby  Sexual activity:  Do not have sex until your healthcare provider says it is okay  You may notice you have a decreased desire for sex, or sex may be painful  You may need to use a vaginal lubricant (gel) to help make sex more comfortable  © 20170 [facility] Information is for End User's use only and may not be sold, redistributed or otherwise used for commercial purposes  All illustrations and images included in CareNotes® are the copyrighted property of A D A M , Inc  or [address]  The above information is an  only  It is not intended as medical advice for individual conditions or treatments  Talk to your doctor, nurse or pharmacist before following any medical regimen to see if it is safe and effective for you  Postpartum Bleeding   WHAT YOU NEED TO KNOW:   Postpartum bleeding is vaginal bleeding after childbirth  This bleeding is normal, whether your baby was born vaginally or by   It contains blood and the tissue that lined the inside of your uterus when you were pregnant     DISCHARGE INSTRUCTIONS:   What to expect with postpartum bleeding:  Postpartum bleeding usually lasts at least 10 days, and may last longer than 6 weeks  Your bleeding may range from light (barely staining a pad) to heavy (soaking a pad in 1 hour)  Usually, you have heavier bleeding right after childbirth, which slows over the next few weeks until it stops  The bleeding is red or dark brown with clots for the first 1 to 3 days  It then turns pink for several days, and then becomes a white or yellow discharge until it ends  Follow up with your obstetrician as directed:  Do not have sex until your obstetrician says it is okay  Write down your questions so you remember to ask them during your visits  Contact your healthcare provider or obstetrician if:   · Your bleeding increases, or you have heavy bleeding that soaks a pad in 1 hour for 2 hours in a row  · You pass large blood clots  · You are breathing faster than normal, or your heart is beating faster than normal     · You are urinating less than usual, or not at all  · You feel dizzy  · You have questions or concerns about your condition or care  Seek immediate care or call 911 if:   · You are suddenly short of breath and feel lightheaded  · You have sudden chest pain  © 2017 [address]  Information is for End User's use only and may not be sold, redistributed or otherwise used for commercial purposes  All illustrations and images included in CareNotes® are the copyrighted property of [employer]  or [place]  The above information is an  only  It is not intended as medical advice for individual conditions or treatments  Talk to your doctor, nurse or pharmacist before following any medical regimen to see if it is safe and effective for you  Breast Care for the Breast Feeding Mother   WHAT YOU SHOULD KNOW:   Your breasts will go through normal changes while you are breastfeeding   Sometimes breast and nipple problems can develop while you are breastfeeding  Learn about changes that are normal and those that may be a problem  Breast care can help you prevent and manage problems so you and your baby can enjoy the benefits of breastfeeding  AFTER YOU LEAVE:   Breast changes while you are breastfeeding:   · For the first few days after your baby is born, your body makes a small amount of breast milk (colostrum)  Within about 2 to 5 days, your body will begin making mature milk  It may take up to 10 days or longer for mature milk to come in  When your mature milk comes in, your breasts will become full and firm  They may feel tender  · Breastfeeding your baby will decrease the full feeling in your breasts  You may feel a tingly sensation during feedings as milk is released from your breasts  This is called the milk let-down reflex  After 7 or more days, the fullness may feel like it has decreased  Your nipples should look the same as they did before you started breastfeeding  Breasts that feel full before and empty after breastfeeding are signs that breastfeeding is going well  Breast problems that can occur while you are breastfeeding:   · Nipple soreness  may occur when you begin to breastfeed your baby  You may also have nipple soreness if your baby is not latched on to your breast correctly  Correct positioning and latch-on may decrease or stop the pain in your nipples  Work with your caregivers to help your baby latch on correctly  It may also be helpful to place warm, wet compresses on your nipples to help decrease pain  · Plugged milk ducts  may cause painful breast lumps  Plugged ducts may be caused by not emptying your breasts completely during feedings  When your baby pauses during breastfeeding, massage and gently squeeze your breast  Gentle massage may unplug a blocked milk duct  Pump out any milk left in your breasts after your baby is done breastfeeding   Avoid wearing tight tops, tight bras, or under-wire bras, because they may put pressure on your breasts  · Engorgement  may occur as your milk comes in soon after you begin breastfeeding  Engorgement may cause your breasts to become swollen and painful  Your breasts may also become engorged if you miss a feeding or you do not breastfeed on demand  The best way to decrease engorgement symptoms is to empty your breasts by feeding your baby often  Engorgement can make it hard for your baby to latch on to your breast  If this happens, express a small amount of milk and then have your baby latch on  Cold compresses, gel packs, or ice packs on your breasts can help decrease pain and swelling  Ask your caregiver how often and how long you should use cold, or ice packs  · A breast infection called mastitis  can develop if you have plugged milk ducts or engorgement  Mastitis causes your breasts to become red, swollen, and painful  You may also have flu-like symptoms, such as chills and a fever  Place heat on your breasts to help decrease the pain  You may want to place a moist, warm cloth on the painful breast or both of your breasts  Ask how often to do this  Your primary healthcare provider [facility]) may suggest that you take an NSAID, such as ibuprofen, to decrease pain and swelling  He may also order antibiotics to treat mastitis  Ask about feeding your baby when you have a breast infection  How to help prevent or manage breast problems while you are breastfeeding:   · Learn how to position your baby and latch him on correctly  To latch your baby correctly to your breast, make sure that his mouth covers most of your areola (dark area around your nipple)  He should not be attached only to the nipple  Your baby is latched on well if you feel comfortable and do not feel pain  A correct latch helps him get enough milk and can help to prevent sore nipples and other breast problems  There are several breastfeeding positions that you can try   Find the position that works best for you and your baby  Ask your caregiver for more information about how to hold and breastfeed your baby  · Prevent biting  Your baby may get teeth at about 1to 3months of age  To help prevent biting, break his suction once he is finished or if he has fallen asleep  To break his suction, slip a finger into the side of his mouth  If your baby bites you, respond with surprise or unhappiness  Offer praise when he does not bite you  · Breastfeed your baby regularly  Feed your baby 8 to 12 times a day  You may need to wake up your baby at night to feed him  It is okay to feed from 1 or both breasts at each feeding  Your baby should breastfeed from both breasts equally over the course of a day  If your baby only feeds from 1 side during a feeding, offer your other breast to him first for the next feeding  · Schedule and keep follow-up visits  Talk to your baby's pediatrician or your PHP during follow-up visits if you have breast problems  Caregivers may suggest that you, or you and your partner, attend classes on breastfeeding  You also may want to join a breastfeeding support group  Caregivers may suggest that you see a lactation consultant  This is a caregiver who can help you with breastfeeding  Contact your PHP if:   · You have a fever and chills  · You have body aches and you feel like you do not have any energy  · One or both of your breasts is red, swollen or hard, painful, and feels warm or hot  · You have breast engorgement that does not get better within 24 hours  · You see or feel a lump in your breast that hurts when you touch it  · You have nipple pain during breastfeeding or between feedings  · Your nipples are red, dry, cracked, or bleeding, or they have scabs on them  · You have questions or concerns about your condition or care    © 2014 [address] is for End User's use only and may not be sold, redistributed or otherwise used for commercial purposes  All illustrations and images included in CareNotes® are the copyrighted property of A D A M , Inc  or [employer]  The above information is an  only  It is not intended as medical advice for individual conditions or treatments  Talk to your doctor, nurse or pharmacist before following any medical regimen to see if it is safe and effective for you

## 2018-06-13 NOTE — OB LABOR/OXYTOCIN SAFETY PROGRESS
Labor Progress Note - Yelena Becerril 33 y o  female MRN: [id number]    Unit/Bed#:  303-01 Encounter: [id number]    Obstetric History       T2      L2     SAB0   TAB0   Ectopic0   Multiple0   Live Births2    Obstetric Comments   Pt has an Umbilical hernia without obstruction     Gestational Age: 43w3d     Contraction Frequency (minutes): 2-3  Contraction Quality: Strong  Tachysystole: No   Dilation: 7        Effacement (%): 100  Station: -1  Baseline Rate: 110 bpm  Fetal Heart Rate: 134 BPM  FHR Category: Category I          Notes/comments:   Patient reports increased pressure and desire to push  SVE 7/100/-1, intact  Category I FHT  Continue expectant management of labor  Continue Clindamycin for GBS+, Epidural at maternal request  Anticipate             Meryl Tidwell MD 2018 12:01 AM

## 2018-06-13 NOTE — OB LABOR/OXYTOCIN SAFETY PROGRESS
Labor Progress Note - Eual Mast 33 y o  female MRN: [id number]    Unit/Bed#: -01 Encounter: [id number]    Obstetric History       T2      L2     SAB0   TAB0   Ectopic0   Multiple0   Live Births2    Obstetric Comments   Pt has an Umbilical hernia without obstruction     Gestational Age: 43w3d     Contraction Frequency (minutes): 2  Contraction Quality: Moderate  Tachysystole: No   Dilation: 10  Dilation Complete Date: 18  Dilation Complete Time: 0144  Effacement (%): 100  Station: 2  Baseline Rate: 110 bpm  Fetal Heart Rate: 134 BPM  FHR Category: Category I          Notes/comments:   Patient with constant pressure  SVE 10/100/+2, KYLEE position  Category I FHT  Will start pushing  Dr Rut Johns aware            Ashley Thrasher MD 2018 1:44 AM

## 2018-06-13 NOTE — DISCHARGE SUMMARY
OB Discharge Summary  Miya Bailey 33 y o  female MRN: [id number]    Unit/Bed#: -01 Encounter: [id number]    Admission Date: 2018     Discharge Date: 6/15/2018    Attending Physician:  Carmela Nix    Discharge Physician: Carmela Nix    Admitting Diagnosis:    Pregnancy at 39 weeks gestation  Labor  GBS positive  Asthma    Discharge Diagnosis:    Same as above, delivered    Procedures:   without laceration    Complications:   None    Hospital Course:   Miya Bailey is a 33 y o  [id number] who was admitted at 39w3d wks for active labor  She was started on Clindamycin for GBS+ in the setting of PCN allergy  She received an epidural for pain control  She progressed to complete cervical dilation, spontaneously ruptured her membranes for clear fluid, and began pushing     She delivered a viable male on 2018 at 0153 with Apgars  9/9  Via spontaneous vaginal delivery  The patient's post partum course was unremarkable  On day of discharge she was ambulating, voiding spontaneously, tolerating oral intake and hemodynamically stable  She is breast feeding   Mom's blood type is O positive   Condition at discharge: stable     Discharge Medications: Prenatal vitamin daily for 6 months or the duration of nursing whichever is longer  Motrin 600 mg orally every 6 hours as needed for pain  Tylenol (over the counter) per bottle directions as needed for pain  Hydrocortisone cream 1% (over the counter) applied 1-2x daily to hemorrhoids as needed  Witch hazel pads for hemorrhoidal discomfort as needed    Discharge instructions :   -Do not place anything (no partner, tampons or douche) in your vagina for 6 weeks    -You may walk for exercise for the first 6 weeks then gradually return to your usual activities    -Please do not drive for 1 week if you have no stitches and for 2 weeks if you have stitches or underwent a  delivery     -You may take baths or shower per your preference    -Please look at your bust (breasts) in the mirror daily and call for redness or tenderness or increased warmth  - If you have had a  please look at your incision daily as well and call us for increasing redness or steady drainage from the incision    -Please call us for temperature > 100 4*F or 38* C, worsening pain or a foul discharge  Disposition: Home    Provisions for Follow-Up Care: Follow up in office in 6 weeks  Call sooner with questions or concerns      Planned Readmission: Nadia Kc MD  6/15/2018  6:54 AM

## 2018-06-13 NOTE — H&P
H&P Exam - Obstetrics   Frederic Massey 33 y o  female MRN: [id number]  Unit/Bed#: -01 Encounter: [id number]    Assessment/Plan     Assessment:  60-year-old  002 at 39 3 weeks, GBS positive, with penicillin anaphylaxis, organism sensitive to clindamycin, admitted for active labor    Plan:  Admit  IV access  CBC, RPR, type and screen  Epidural upon request  Clindamycin for GBS positive  Expectant management of labor    History of Present Illness   Chief Complaint: Active labor    HPI:  Frederic Massey is a 33 y o   female with an YONNY of 2018, by Ultrasound at 39w3d weeks gestation who is being admitted for Active labor  Her current obstetrical history is significant for uncomplicated  Contractions: q2-3 min  Leakage of fluid: None  Bleeding: None  Fetal movement: present  Pregnancy complications:   none  Review of Systems   Constitutional: Negative  Respiratory: Negative  Cardiovascular: Negative  Gastrointestinal:        Painful uterine contractions every 3 minutes   Genitourinary: Negative  All other systems reviewed and are negative        Historical Information   OB History    Para Term  AB Living   3 2 2     2   SAB TAB Ectopic Multiple Live Births         0 2      # Outcome Date GA Lbr Pop/2nd Weight Sex Delivery Anes PTL Lv   3 Current            2 Term 10/19/16 39w1d / 00:06 3005 g (6 lb 10 oz) M Vag-Spont EPI N CORRIE   1 Term 12 39w0d  2835 g (6 lb 4 oz) F Vag-Spont EPI Y CORRIE      Obstetric Comments   Pt has an Umbilical hernia without obstruction     Baby complications/comments: none  Past Medical History:   Diagnosis Date    Anemia 2018    mild     Asthma     excercise  induced    Migraine     hx of and with pregnancy     Migraine     ocular migraine with pregnancy     Normal delivery      daughter, [id number] son    Umbilical hernia without obstruction and without gangrene     per pt plan of care is to have surgery after delivery     Varicella     vaccine  x2    Visual impairment     eyeglasses as needed      Past Surgical History:   Procedure Laterality Date    WISDOM TOOTH EXTRACTION Bilateral 2007     Social History   History   Alcohol Use No     Comment: has not drank since pregnancy     History   Drug Use No     History   Smoking Status    Never Smoker   Smokeless Tobacco    Never Used     Family History: non-contributory    Meds/Allergies   all medications and allergies reviewed  Allergies   Allergen Reactions    Penicillins Anaphylaxis and Rash       Objective   Vitals: Blood pressure 125/63, pulse 95, resp  rate 18, currently breastfeeding  There is no height or weight on file to calculate BMI  Invasive Devices          No matching active lines, drains, or airways          Physical Exam   Constitutional: She is oriented to person, place, and time  She appears well-developed and well-nourished  HENT:   Head: Normocephalic and atraumatic  Neck: Normal range of motion  Neck supple  Cardiovascular: Normal rate and regular rhythm  Pulmonary/Chest: Effort normal and breath sounds normal    Abdominal: Bowel sounds are normal    Gravid   Genitourinary: Vagina normal and uterus normal    Genitourinary Comments: Cervical exam 5/80/-2   Musculoskeletal: Normal range of motion  Neurological: She is alert and oriented to person, place, and time  Prenatal Labs:   Blood type:  O positive  Antibody screen:  Negative  HIV:  Negative  RPR:  Negative  Rubella:  Immune  Hepatitis-B:  Negative  GC chlamydia:  Negative  Glucola:  90  GBS:  Positive    Imaging, EKG, Pathology, and Other Studies: I have personally reviewed pertinent reports

## 2018-06-13 NOTE — ANESTHESIA PROCEDURE NOTES
Epidural Block    Patient location during procedure: OB  Start time: 6/13/2018 12:05 AM  Reason for block: at surgeon's request and primary anesthetic  Staffing  Anesthesiologist: Hanh Harmon  Performed: anesthesiologist   Preanesthetic Checklist  Completed: patient identified, site marked, surgical consent, pre-op evaluation, timeout performed, IV checked, risks and benefits discussed and monitors and equipment checked  Epidural  Patient position: sitting  Prep: Betadine and site prepped and draped  Patient monitoring: frequent blood pressure checks, continuous pulse ox and heart rate  Approach: midline  Location: lumbar (1-5)  Injection technique: LAURA saline  Needle  Needle type: Tuohy   Needle gauge: 18 G  Catheter type: side hole  Catheter size: 20 G  Catheter at skin depth: 10 cm  Test dose: negative and lidocaine 1 5% with epinephrine 1-to-200,000  Assessment  Sensory level: [id number] aspiration for CSF, negative aspiration for heme and no paresthesia on injection  patient tolerated the procedure well with no immediate complications

## 2018-06-13 NOTE — ANESTHESIA POSTPROCEDURE EVALUATION
Post-Op Assessment Note      CV Status:  Stable    Mental Status:  Alert and awake    Hydration Status:  Stable    PONV Controlled:  None    Airway Patency:  Patent        Staff: Anesthesiologist       Comments: Catheter removed, tip intact, site is nontender to palpation without evidence of bruising or hematoma    Patient reports overall satisfaction with anesthesia care    Post-op block assessment: no complications        BP   113/63   Temp   97 8   Pulse  84   Resp   16   SpO2   99

## 2018-06-14 PROCEDURE — 99024 POSTOP FOLLOW-UP VISIT: CPT | Performed by: OBSTETRICS & GYNECOLOGY

## 2018-06-14 RX ADMIN — DOCUSATE SODIUM 100 MG: 100 CAPSULE, LIQUID FILLED ORAL at 08:22

## 2018-06-14 RX ADMIN — DOCUSATE SODIUM 100 MG: 100 CAPSULE, LIQUID FILLED ORAL at 18:09

## 2018-06-14 NOTE — PROGRESS NOTES
Progress Note - OB/GYN   Irma Noriega 33 y o  female MRN: [id number]  Unit/Bed#: -01 Encounter: [id number]    Assessment:  Post partum Day #1 s/p , stable, baby in room  GBS positive - inadequately treated (delivered within 3 hours)    Plan:    1) Continue routine post partum care   Encourage ambulation   Encourage breastfeeding   Anticipate discharge tomorrow     Subjective/Objective   Chief Complaint:     Post delivery  Patient is doing well  Lochia WNL  Pain well controlled  Subjective:     Pain: yes, cramping, improved with meds  Tolerating PO: yes  Voiding: yes  Flatus: yes  BM: no  Ambulating: yes  Breastfeeding:  yes  Chest pain: no  Shortness of breath: no  Leg pain: no  Lochia: minimal    Objective:     Vitals: /65   Pulse 72   Temp 98 1 °F (36 7 °C) (Oral)   Resp 16   Ht 5' 3" (1 6 m)   Wt 70 8 kg (156 lb)   SpO2 94%   Breastfeeding? Yes   BMI 27 63 kg/m²       Intake/Output Summary (Last 24 hours) at 18 0607  Last data filed at 18 0901   Gross per 24 hour   Intake                0 ml   Output             1000 ml   Net            -1000 ml       Lab Results   Component Value Date    WBC 11 78 (H) 2018    HGB 10 9 (L) 2018    HCT 33 3 (L) 2018    MCV 85 2018     2018       Physical Exam:     Gen: AAOx3, NAD  CV: RRR  Lungs: CTA b/l  Abd: Soft, non-tender, non-distended, no rebound or guarding  Uterine fundus firm and non-tender, 1 cm below the umbilicus     Ext: Non tender    Pineda Appiah MD  2018  6:07 AM

## 2018-06-14 NOTE — PLAN OF CARE
INFECTION - ADULT     Absence of fever/infection during neutropenic period Completed        Knowledge Deficit     Verbalizes understanding of labor plan Completed

## 2018-06-15 VITALS
OXYGEN SATURATION: 98 % | BODY MASS INDEX: 27.64 KG/M2 | HEART RATE: 68 BPM | SYSTOLIC BLOOD PRESSURE: 114 MMHG | WEIGHT: 156 LBS | TEMPERATURE: 98.1 F | RESPIRATION RATE: 20 BRPM | DIASTOLIC BLOOD PRESSURE: 86 MMHG | HEIGHT: 63 IN

## 2018-06-15 RX ORDER — DOCUSATE SODIUM 100 MG/1
100 CAPSULE, LIQUID FILLED ORAL 2 TIMES DAILY
Qty: 10 CAPSULE | Refills: 0
Start: 2018-06-15 | End: 2018-07-31 | Stop reason: ALTCHOICE

## 2018-06-15 RX ORDER — IBUPROFEN 600 MG/1
600 TABLET ORAL EVERY 6 HOURS PRN
Qty: 30 TABLET | Refills: 0
Start: 2018-06-15 | End: 2020-01-02 | Stop reason: ALTCHOICE

## 2018-06-15 RX ADMIN — DOCUSATE SODIUM 100 MG: 100 CAPSULE, LIQUID FILLED ORAL at 08:23

## 2018-06-15 NOTE — PROGRESS NOTES
Progress Note - OB/GYN   Tiffany Pena 33 y o  female MRN: [id number]  Unit/Bed#:  324-01 Encounter: [id number]    Assessment:  Post partum Day #2 s/p , stable, baby in room    Plan:  1) Asthma   Asymptomatic     2) Continue routine post partum care   Encourage ambulation   Encourage breastfeeding   Anticipate discharge today     Subjective/Objective   Chief Complaint:     Post delivery  Patient is doing well  Lochia WNL  Pain well controlled  Subjective:     Pain: yes, cramping, improved with meds  Tolerating PO: yes  Voiding: yes  Flatus: yes  BM: yes  Ambulating: yes  Breastfeeding:  yes  Chest pain: no  Shortness of breath: no  Leg pain: no  Lochia: minimal    Objective:     Vitals: BP (!) 89/54 (BP Location: Right arm) Comment: notied RN Corazon  Pulse 68   Temp 97 8 °F (36 6 °C) (Oral)   Resp 18   Ht 5' 3" (1 6 m)   Wt 70 8 kg (156 lb)   SpO2 98%   Breastfeeding? Yes   BMI 27 63 kg/m²     No intake or output data in the 24 hours ending 06/15/18 0652    Lab Results   Component Value Date    WBC 11 78 (H) 2018    HGB 10 9 (L) 2018    HCT 33 3 (L) 2018    MCV 85 2018     2018       Physical Exam:     Gen: AAOx3, NAD  CV: RRR  Lungs: CTA b/l  Abd: Soft, non-tender, non-distended, no rebound or guarding  Uterine fundus firm and non-tender, 2 cm below the umbilicus     Ext: Non tender    Ronnie Chua MD  6/15/2018  6:52 AM

## 2018-06-20 LAB — PLACENTA IN STORAGE: NORMAL

## 2018-07-31 ENCOUNTER — POSTPARTUM VISIT (OUTPATIENT)
Dept: OBGYN CLINIC | Facility: MEDICAL CENTER | Age: 28
End: 2018-07-31

## 2018-07-31 VITALS — WEIGHT: 143 LBS | SYSTOLIC BLOOD PRESSURE: 124 MMHG | BODY MASS INDEX: 25.33 KG/M2 | DIASTOLIC BLOOD PRESSURE: 62 MMHG

## 2018-07-31 DIAGNOSIS — K42.9 UMBILICAL HERNIA WITHOUT OBSTRUCTION AND WITHOUT GANGRENE: ICD-10-CM

## 2018-07-31 PROBLEM — Z34.90 SUPERVISION OF NORMAL PREGNANCY: Status: RESOLVED | Noted: 2018-05-30 | Resolved: 2018-07-31

## 2018-07-31 PROBLEM — Z34.83 PRENATAL CARE, SUBSEQUENT PREGNANCY, THIRD TRIMESTER: Status: RESOLVED | Noted: 2018-05-23 | Resolved: 2018-07-31

## 2018-07-31 PROBLEM — Z3A.37 37 WEEKS GESTATION OF PREGNANCY: Status: RESOLVED | Noted: 2018-05-30 | Resolved: 2018-07-31

## 2018-07-31 PROCEDURE — 99024 POSTOP FOLLOW-UP VISIT: CPT | Performed by: NURSE PRACTITIONER

## 2018-07-31 NOTE — PROGRESS NOTES
POSTPARTUM VISIT    SUBJECTIVE    HPI: Maria L Lerma is a 33 y o  now  female here today for postpartum visit  She is s/p normal spontaneous vaginal delivery on 18 at 39 weeks, 4 days  A healthy male infant was born, Apgars of 9, 9 and weighing 6 lb 9 oz  Delivery complications: None   complications: None  Pregnancy complications: GBS positive  There was no laceration  The babys name is CHICHI  She was discharged from the hospital on postoperative day #2  The patient is not breastfeeding  Moving bowels well  Urinating well  Lochia as expected  Denies leg pain or SOB  EPDS score today is 0  She did not have an abnormal pap smear prior to pregnancy (3/30/2016, NIL)  She did not have GDM  Menses have returned   had a vasectomy while she was still pregnant in March - has not yet gone for his F/U semen analysis to confirm sterility however he plans to and I encouraged this  Review Of Systems  General ROS: negative for - chills or fever  Psychological ROS: negative for - suicidal ideation  Breast ROS: negative for breast lumps  Respiratory ROS: negative for - shortness of breath  Cardiovascular ROS: negative for - chest pain  Gastrointestinal ROS: negative for - abdominal pain, change in bowel habits or nausea/vomiting  Genito-Urinary ROS: negative for - dysuria or vulvar/vaginal symptoms  Neurological ROS: negative for - confusion or visual changes      OBJECTIVE  Vitals:    18 0738   BP: 124/62   Weight: 64 9 kg (143 lb)       Physical Exam  GENERAL APPEARANCE: alert, well appearing, in no apparent distress  THYROID: no thyromegaly or masses present  LUNGS: clear to auscultation, no wheezes, rales or rhonchi, symmetric air entry  HEART: regular rate and rhythm  ABDOMEN POSTPARTUM: benign non-tender, without masses or organomegaly palpable  Reducible umbilical hernia present    EXTREMITIES: no redness or tenderness in the calves or thighs  SKIN: normal coloration and turgor, no rashes  NEUROLOGIC: Grossly intact  EXTERNAL GENITALIA POSTPARTUM: normal, well-healed, without lesions or masses  VAGINA POSTPARTUM: normal, well-healed, physiologic discharge, without lesions  CERVIX POSTPARTUM: normal, well-healed, without lesions  UTERUS POSTPARTUM: normal size, well involuted, firm, non-tender  ADNEXA POSTPARTUM: no masses palpable and nontender        ASSESSMENT  Normal postpartum exam      PLAN  1  Return for annual gynecologic exam  2  Given referral to General Surgery for correction of umbilical hernia      All questions answered & patient expressed understanding      Pablo Bowers

## 2018-07-31 NOTE — LETTER
July 31, 2018       Patient: Demarco Mederos   YOB: 1990   Date of Visit: 7/31/2018       To Whom It May Concern:    Please note that Demarco Mederos is able to return to work  Do not hesitate to contact our office with any questions or concerns           Sincerely,        ALEX Johnson        CC: No Recipients

## 2019-05-08 ENCOUNTER — HOSPITAL ENCOUNTER (EMERGENCY)
Facility: HOSPITAL | Age: 29
Discharge: HOME | End: 2019-05-08
Attending: EMERGENCY MEDICINE | Admitting: EMERGENCY MEDICINE
Payer: COMMERCIAL

## 2019-05-08 VITALS
RESPIRATION RATE: 18 BRPM | WEIGHT: 143.08 LBS | TEMPERATURE: 98.1 F | OXYGEN SATURATION: 99 % | DIASTOLIC BLOOD PRESSURE: 68 MMHG | SYSTOLIC BLOOD PRESSURE: 127 MMHG | HEART RATE: 71 BPM | BODY MASS INDEX: 25.35 KG/M2

## 2019-05-08 DIAGNOSIS — K42.0 INCARCERATED UMBILICAL HERNIA: Primary | ICD-10-CM

## 2019-05-08 PROCEDURE — 99283 EMERGENCY DEPT VISIT LOW MDM: CPT

## 2019-05-08 PROCEDURE — 99283 EMERGENCY DEPT VISIT LOW MDM: CPT | Performed by: NURSE PRACTITIONER

## 2019-06-04 ENCOUNTER — OFFICE VISIT (OUTPATIENT)
Dept: SURGERY | Facility: CLINIC | Age: 29
End: 2019-06-04
Payer: COMMERCIAL

## 2019-06-04 VITALS
BODY MASS INDEX: 23.74 KG/M2 | RESPIRATION RATE: 12 BRPM | TEMPERATURE: 98.6 F | HEIGHT: 63 IN | HEART RATE: 64 BPM | DIASTOLIC BLOOD PRESSURE: 70 MMHG | WEIGHT: 134 LBS | SYSTOLIC BLOOD PRESSURE: 122 MMHG

## 2019-06-04 DIAGNOSIS — K42.0 INCARCERATED UMBILICAL HERNIA: Primary | ICD-10-CM

## 2019-06-04 PROCEDURE — 99213 OFFICE O/P EST LOW 20 MIN: CPT | Performed by: SURGERY

## 2019-06-04 RX ORDER — ENOXAPARIN SODIUM 300 MG/3ML
40 INJECTION INTRAVENOUS; SUBCUTANEOUS
Status: CANCELLED | OUTPATIENT
Start: 2019-06-04 | End: 2019-06-05

## 2019-06-04 RX ORDER — SODIUM CHLORIDE, SODIUM LACTATE, POTASSIUM CHLORIDE, CALCIUM CHLORIDE 600; 310; 30; 20 MG/100ML; MG/100ML; MG/100ML; MG/100ML
125 INJECTION, SOLUTION INTRAVENOUS
Status: CANCELLED | OUTPATIENT
Start: 2019-06-04 | End: 2019-06-05

## 2019-06-05 PROBLEM — K42.0 INCARCERATED UMBILICAL HERNIA: Status: ACTIVE | Noted: 2019-06-05

## 2019-06-10 ENCOUNTER — APPOINTMENT (OUTPATIENT)
Dept: LAB | Facility: HOSPITAL | Age: 29
End: 2019-06-10
Attending: SURGERY
Payer: COMMERCIAL

## 2019-06-10 DIAGNOSIS — K42.0 INCARCERATED UMBILICAL HERNIA: ICD-10-CM

## 2019-06-10 LAB
ANION GAP SERPL CALCULATED.3IONS-SCNC: 9 MMOL/L (ref 4–13)
BASOPHILS # BLD AUTO: 0.03 THOUSANDS/ΜL (ref 0–0.1)
BASOPHILS NFR BLD AUTO: 0 % (ref 0–1)
BUN SERPL-MCNC: 20 MG/DL (ref 5–25)
CALCIUM SERPL-MCNC: 9.2 MG/DL (ref 8.3–10.1)
CHLORIDE SERPL-SCNC: 105 MMOL/L (ref 100–108)
CO2 SERPL-SCNC: 26 MMOL/L (ref 21–32)
CREAT SERPL-MCNC: 0.77 MG/DL (ref 0.6–1.3)
EOSINOPHIL # BLD AUTO: 0.28 THOUSAND/ΜL (ref 0–0.61)
EOSINOPHIL NFR BLD AUTO: 3 % (ref 0–6)
ERYTHROCYTE [DISTWIDTH] IN BLOOD BY AUTOMATED COUNT: 15.5 % (ref 11.6–15.1)
GFR SERPL CREATININE-BSD FRML MDRD: 106 ML/MIN/1.73SQ M
GLUCOSE SERPL-MCNC: 83 MG/DL (ref 65–140)
HCT VFR BLD AUTO: 40 % (ref 34.8–46.1)
HGB BLD-MCNC: 13 G/DL (ref 11.5–15.4)
IMM GRANULOCYTES # BLD AUTO: 0.02 THOUSAND/UL (ref 0–0.2)
IMM GRANULOCYTES NFR BLD AUTO: 0 % (ref 0–2)
LYMPHOCYTES # BLD AUTO: 3.17 THOUSANDS/ΜL (ref 0.6–4.47)
LYMPHOCYTES NFR BLD AUTO: 37 % (ref 14–44)
MCH RBC QN AUTO: 29.5 PG (ref 26.8–34.3)
MCHC RBC AUTO-ENTMCNC: 32.5 G/DL (ref 31.4–37.4)
MCV RBC AUTO: 91 FL (ref 82–98)
MONOCYTES # BLD AUTO: 0.64 THOUSAND/ΜL (ref 0.17–1.22)
MONOCYTES NFR BLD AUTO: 7 % (ref 4–12)
NEUTROPHILS # BLD AUTO: 4.46 THOUSANDS/ΜL (ref 1.85–7.62)
NEUTS SEG NFR BLD AUTO: 53 % (ref 43–75)
NRBC BLD AUTO-RTO: 0 /100 WBCS
PLATELET # BLD AUTO: 233 THOUSANDS/UL (ref 149–390)
PMV BLD AUTO: 11.4 FL (ref 8.9–12.7)
POTASSIUM SERPL-SCNC: 4.1 MMOL/L (ref 3.5–5.3)
RBC # BLD AUTO: 4.4 MILLION/UL (ref 3.81–5.12)
SODIUM SERPL-SCNC: 140 MMOL/L (ref 136–145)
WBC # BLD AUTO: 8.6 THOUSAND/UL (ref 4.31–10.16)

## 2019-06-10 PROCEDURE — 36415 COLL VENOUS BLD VENIPUNCTURE: CPT

## 2019-06-10 PROCEDURE — 80048 BASIC METABOLIC PNL TOTAL CA: CPT

## 2019-06-10 PROCEDURE — 85025 COMPLETE CBC W/AUTO DIFF WBC: CPT

## 2019-06-13 RX ORDER — ALBUTEROL SULFATE 90 UG/1
2 AEROSOL, METERED RESPIRATORY (INHALATION) EVERY 6 HOURS PRN
COMMUNITY

## 2019-06-14 ENCOUNTER — ANESTHESIA EVENT (OUTPATIENT)
Dept: PERIOP | Facility: HOSPITAL | Age: 29
End: 2019-06-14
Payer: COMMERCIAL

## 2019-06-14 ENCOUNTER — ANESTHESIA (OUTPATIENT)
Dept: PERIOP | Facility: HOSPITAL | Age: 29
End: 2019-06-14
Payer: COMMERCIAL

## 2019-06-14 ENCOUNTER — HOSPITAL ENCOUNTER (OUTPATIENT)
Facility: HOSPITAL | Age: 29
Setting detail: OUTPATIENT SURGERY
Discharge: HOME | End: 2019-06-14
Attending: SURGERY | Admitting: SURGERY
Payer: COMMERCIAL

## 2019-06-14 VITALS
BODY MASS INDEX: 23.83 KG/M2 | TEMPERATURE: 97.8 F | SYSTOLIC BLOOD PRESSURE: 115 MMHG | HEIGHT: 63 IN | HEART RATE: 60 BPM | DIASTOLIC BLOOD PRESSURE: 68 MMHG | RESPIRATION RATE: 18 BRPM | OXYGEN SATURATION: 98 % | WEIGHT: 134.48 LBS

## 2019-06-14 DIAGNOSIS — K42.0 INCARCERATED UMBILICAL HERNIA: ICD-10-CM

## 2019-06-14 LAB
EXT PREGNANCY TEST URINE: NEGATIVE
EXT. CONTROL: NORMAL

## 2019-06-14 PROCEDURE — 49653 PR LAP, VENTRAL HERNIA REPAIR,INCARCERATED: CPT | Performed by: SURGERY

## 2019-06-14 PROCEDURE — 81025 URINE PREGNANCY TEST: CPT | Performed by: SURGERY

## 2019-06-14 PROCEDURE — C1781 MESH (IMPLANTABLE): HCPCS | Performed by: SURGERY

## 2019-06-14 DEVICE — VENTRALIGHT ST MESH
Type: IMPLANTABLE DEVICE | Site: ABDOMEN | Status: FUNCTIONAL
Brand: VENTRALIGHT ST

## 2019-06-14 DEVICE — FIXATION SECURESTRAP 5 MM ABSORB DISP: Type: IMPLANTABLE DEVICE | Site: ABDOMEN | Status: FUNCTIONAL

## 2019-06-14 RX ORDER — TRAMADOL HYDROCHLORIDE 50 MG/1
50 TABLET ORAL EVERY 6 HOURS PRN
Status: DISCONTINUED | OUTPATIENT
Start: 2019-06-14 | End: 2019-06-14 | Stop reason: HOSPADM

## 2019-06-14 RX ORDER — KETOROLAC TROMETHAMINE 30 MG/ML
INJECTION, SOLUTION INTRAMUSCULAR; INTRAVENOUS AS NEEDED
Status: DISCONTINUED | OUTPATIENT
Start: 2019-06-14 | End: 2019-06-14 | Stop reason: SURG

## 2019-06-14 RX ORDER — BUPIVACAINE HYDROCHLORIDE 2.5 MG/ML
INJECTION, SOLUTION INFILTRATION; PERINEURAL AS NEEDED
Status: DISCONTINUED | OUTPATIENT
Start: 2019-06-14 | End: 2019-06-14 | Stop reason: HOSPADM

## 2019-06-14 RX ORDER — SODIUM CHLORIDE, SODIUM LACTATE, POTASSIUM CHLORIDE, CALCIUM CHLORIDE 600; 310; 30; 20 MG/100ML; MG/100ML; MG/100ML; MG/100ML
50 INJECTION, SOLUTION INTRAVENOUS CONTINUOUS
Status: CANCELLED | OUTPATIENT
Start: 2019-06-14

## 2019-06-14 RX ORDER — ROCURONIUM BROMIDE 10 MG/ML
INJECTION, SOLUTION INTRAVENOUS AS NEEDED
Status: DISCONTINUED | OUTPATIENT
Start: 2019-06-14 | End: 2019-06-14 | Stop reason: SURG

## 2019-06-14 RX ORDER — HYDROMORPHONE HCL/PF 1 MG/ML
0.4 SYRINGE (ML) INJECTION
Status: DISCONTINUED | OUTPATIENT
Start: 2019-06-14 | End: 2019-06-14 | Stop reason: HOSPADM

## 2019-06-14 RX ORDER — MIDAZOLAM HYDROCHLORIDE 1 MG/ML
2 INJECTION INTRAMUSCULAR; INTRAVENOUS ONCE
Status: COMPLETED | OUTPATIENT
Start: 2019-06-14 | End: 2019-06-14

## 2019-06-14 RX ORDER — CLINDAMYCIN PHOSPHATE 900 MG/50ML
900 INJECTION INTRAVENOUS
Status: COMPLETED | OUTPATIENT
Start: 2019-06-14 | End: 2019-06-14

## 2019-06-14 RX ORDER — ONDANSETRON 2 MG/ML
4 INJECTION INTRAMUSCULAR; INTRAVENOUS EVERY 8 HOURS PRN
Status: DISCONTINUED | OUTPATIENT
Start: 2019-06-14 | End: 2019-06-14 | Stop reason: HOSPADM

## 2019-06-14 RX ORDER — MAGNESIUM HYDROXIDE 1200 MG/15ML
LIQUID ORAL AS NEEDED
Status: DISCONTINUED | OUTPATIENT
Start: 2019-06-14 | End: 2019-06-14 | Stop reason: HOSPADM

## 2019-06-14 RX ORDER — HYDROMORPHONE HYDROCHLORIDE 2 MG/ML
INJECTION, SOLUTION INTRAMUSCULAR; INTRAVENOUS; SUBCUTANEOUS AS NEEDED
Status: DISCONTINUED | OUTPATIENT
Start: 2019-06-14 | End: 2019-06-14

## 2019-06-14 RX ORDER — LIDOCAINE HYDROCHLORIDE 10 MG/ML
INJECTION, SOLUTION INFILTRATION; PERINEURAL AS NEEDED
Status: DISCONTINUED | OUTPATIENT
Start: 2019-06-14 | End: 2019-06-14 | Stop reason: SURG

## 2019-06-14 RX ORDER — FENTANYL CITRATE 50 UG/ML
INJECTION, SOLUTION INTRAMUSCULAR; INTRAVENOUS AS NEEDED
Status: DISCONTINUED | OUTPATIENT
Start: 2019-06-14 | End: 2019-06-14 | Stop reason: SURG

## 2019-06-14 RX ORDER — PROPOFOL 10 MG/ML
INJECTION, EMULSION INTRAVENOUS AS NEEDED
Status: DISCONTINUED | OUTPATIENT
Start: 2019-06-14 | End: 2019-06-14 | Stop reason: SURG

## 2019-06-14 RX ORDER — ONDANSETRON 2 MG/ML
INJECTION INTRAMUSCULAR; INTRAVENOUS AS NEEDED
Status: DISCONTINUED | OUTPATIENT
Start: 2019-06-14 | End: 2019-06-14 | Stop reason: SURG

## 2019-06-14 RX ORDER — ACETAMINOPHEN AND CODEINE PHOSPHATE 300; 30 MG/1; MG/1
1 TABLET ORAL EVERY 6 HOURS PRN
Qty: 20 TABLET | Refills: 0 | Status: SHIPPED | OUTPATIENT
Start: 2019-06-14 | End: 2019-06-24

## 2019-06-14 RX ORDER — PROMETHAZINE HYDROCHLORIDE 25 MG/ML
12.5 INJECTION, SOLUTION INTRAMUSCULAR; INTRAVENOUS ONCE AS NEEDED
Status: DISCONTINUED | OUTPATIENT
Start: 2019-06-14 | End: 2019-06-14 | Stop reason: HOSPADM

## 2019-06-14 RX ORDER — DEXMEDETOMIDINE HYDROCHLORIDE 100 UG/ML
INJECTION, SOLUTION INTRAVENOUS AS NEEDED
Status: DISCONTINUED | OUTPATIENT
Start: 2019-06-14 | End: 2019-06-14 | Stop reason: SURG

## 2019-06-14 RX ORDER — ONDANSETRON 2 MG/ML
4 INJECTION INTRAMUSCULAR; INTRAVENOUS ONCE AS NEEDED
Status: DISCONTINUED | OUTPATIENT
Start: 2019-06-14 | End: 2019-06-14 | Stop reason: HOSPADM

## 2019-06-14 RX ORDER — SODIUM CHLORIDE, SODIUM LACTATE, POTASSIUM CHLORIDE, CALCIUM CHLORIDE 600; 310; 30; 20 MG/100ML; MG/100ML; MG/100ML; MG/100ML
INJECTION, SOLUTION INTRAVENOUS CONTINUOUS PRN
Status: DISCONTINUED | OUTPATIENT
Start: 2019-06-14 | End: 2019-06-14 | Stop reason: SURG

## 2019-06-14 RX ORDER — DEXAMETHASONE SODIUM PHOSPHATE 10 MG/ML
INJECTION, SOLUTION INTRAMUSCULAR; INTRAVENOUS AS NEEDED
Status: DISCONTINUED | OUTPATIENT
Start: 2019-06-14 | End: 2019-06-14 | Stop reason: SURG

## 2019-06-14 RX ORDER — METOCLOPRAMIDE HYDROCHLORIDE 5 MG/ML
10 INJECTION INTRAMUSCULAR; INTRAVENOUS ONCE AS NEEDED
Status: DISCONTINUED | OUTPATIENT
Start: 2019-06-14 | End: 2019-06-14 | Stop reason: HOSPADM

## 2019-06-14 RX ORDER — FENTANYL CITRATE/PF 50 MCG/ML
50 SYRINGE (ML) INJECTION
Status: DISCONTINUED | OUTPATIENT
Start: 2019-06-14 | End: 2019-06-14 | Stop reason: HOSPADM

## 2019-06-14 RX ORDER — SODIUM CHLORIDE, SODIUM LACTATE, POTASSIUM CHLORIDE, CALCIUM CHLORIDE 600; 310; 30; 20 MG/100ML; MG/100ML; MG/100ML; MG/100ML
125 INJECTION, SOLUTION INTRAVENOUS
Status: COMPLETED | OUTPATIENT
Start: 2019-06-14 | End: 2019-06-14

## 2019-06-14 RX ORDER — ALBUTEROL SULFATE 2.5 MG/3ML
2.5 SOLUTION RESPIRATORY (INHALATION) ONCE AS NEEDED
Status: DISCONTINUED | OUTPATIENT
Start: 2019-06-14 | End: 2019-06-14 | Stop reason: HOSPADM

## 2019-06-14 RX ADMIN — PROPOFOL 200 MG: 10 INJECTION, EMULSION INTRAVENOUS at 11:05

## 2019-06-14 RX ADMIN — KETOROLAC TROMETHAMINE 15 MG: 30 INJECTION, SOLUTION INTRAMUSCULAR at 11:40

## 2019-06-14 RX ADMIN — ONDANSETRON 4 MG: 2 INJECTION INTRAMUSCULAR; INTRAVENOUS at 11:40

## 2019-06-14 RX ADMIN — DEXAMETHASONE SODIUM PHOSPHATE 10 MG: 10 INJECTION, SOLUTION INTRAMUSCULAR; INTRAVENOUS at 11:07

## 2019-06-14 RX ADMIN — LIDOCAINE HYDROCHLORIDE 50 MG: 10 INJECTION, SOLUTION INFILTRATION; PERINEURAL at 11:04

## 2019-06-14 RX ADMIN — MIDAZOLAM 2 MG: 1 INJECTION INTRAMUSCULAR; INTRAVENOUS at 10:25

## 2019-06-14 RX ADMIN — DEXMEDETOMIDINE HCL 10 MCG: 100 INJECTION INTRAVENOUS at 11:05

## 2019-06-14 RX ADMIN — CLINDAMYCIN PHOSPHATE 900 MG: 18 INJECTION, SOLUTION INTRAMUSCULAR; INTRAVENOUS at 10:51

## 2019-06-14 RX ADMIN — MORPHINE SULFATE 2 MG: 2 INJECTION, SOLUTION INTRAMUSCULAR; INTRAVENOUS at 13:55

## 2019-06-14 RX ADMIN — DEXMEDETOMIDINE HCL 10 MCG: 100 INJECTION INTRAVENOUS at 11:07

## 2019-06-14 RX ADMIN — SUGAMMADEX 200 MG: 100 INJECTION, SOLUTION INTRAVENOUS at 11:43

## 2019-06-14 RX ADMIN — SODIUM CHLORIDE, SODIUM LACTATE, POTASSIUM CHLORIDE, AND CALCIUM CHLORIDE 125 ML/HR: .6; .31; .03; .02 INJECTION, SOLUTION INTRAVENOUS at 10:05

## 2019-06-14 RX ADMIN — ENOXAPARIN SODIUM 40 MG: 40 INJECTION SUBCUTANEOUS at 10:28

## 2019-06-14 RX ADMIN — ROCURONIUM BROMIDE 40 MG: 10 INJECTION, SOLUTION INTRAVENOUS at 11:05

## 2019-06-14 RX ADMIN — SODIUM CHLORIDE, SODIUM LACTATE, POTASSIUM CHLORIDE, AND CALCIUM CHLORIDE: .6; .31; .03; .02 INJECTION, SOLUTION INTRAVENOUS at 11:44

## 2019-06-14 RX ADMIN — FENTANYL CITRATE 100 MCG: 50 INJECTION, SOLUTION INTRAMUSCULAR; INTRAVENOUS at 11:04

## 2019-06-14 RX ADMIN — SODIUM CHLORIDE, SODIUM LACTATE, POTASSIUM CHLORIDE, AND CALCIUM CHLORIDE: .6; .31; .03; .02 INJECTION, SOLUTION INTRAVENOUS at 09:30

## 2019-06-14 RX ADMIN — FENTANYL CITRATE 50 MCG: 50 INJECTION, SOLUTION INTRAMUSCULAR; INTRAVENOUS at 12:42

## 2019-07-02 ENCOUNTER — OFFICE VISIT (OUTPATIENT)
Dept: SURGERY | Facility: CLINIC | Age: 29
End: 2019-07-02

## 2019-07-02 VITALS
SYSTOLIC BLOOD PRESSURE: 98 MMHG | HEART RATE: 86 BPM | HEIGHT: 63 IN | BODY MASS INDEX: 23.39 KG/M2 | RESPIRATION RATE: 12 BRPM | WEIGHT: 132 LBS | DIASTOLIC BLOOD PRESSURE: 56 MMHG | TEMPERATURE: 98.1 F

## 2019-07-02 DIAGNOSIS — Z48.89 POSTOPERATIVE VISIT: Primary | ICD-10-CM

## 2019-07-02 PROCEDURE — 99024 POSTOP FOLLOW-UP VISIT: CPT | Performed by: SURGERY

## 2019-07-02 NOTE — PROGRESS NOTES
Pt states no pains or discomfort  Bowels have returned to normal   Eating with no nausea  No fevers

## 2019-07-02 NOTE — PROGRESS NOTES
Post-Op Follow Up- General Surgery   Edis Jacome 33 y o  female MRN: [id number]  Unit/Bed#:  Encounter: [id number]    Assessment/Plan     Assessment:  Status post laparoscopic umbilical hernia repair with mesh, improved  Plan:  Patient is discharged from my care and I will be glad to see her if any problem arises in the future  History of Present Illness     HPI:  Edis Jacome is a 33 y o  female who presents to my office for 1st postop follow-up after laparoscopic umbilical hernia repair with mesh  The patient stated doing well, tolerating diet having regular bowel movement  She denies having any fever, chills, nausea, vomiting, diarrhea, constipation or urinary symptoms  She has no incisional pain        Historical Information   Past Medical History:   Diagnosis Date    Anemia 2018    mild     Asthma     excercise  induced    Migraine     ocular migraine with pregnancy     Normal delivery      daughter, 2016 son     (spontaneous vaginal delivery) 4044    Umbilical hernia without obstruction and without gangrene     per pt plan of care is to have surgery after delivery     Varicella     vaccine  x2    Visual impairment     eyeglasses as needed      Past Surgical History:   Procedure Laterality Date    UMBILICAL HERNIA REPAIR LAPAROSCOPIC N/A 2019    Procedure: LAPAROSCOPIC UMBILICAL HERNIA REPAIR;  Surgeon: Tony Whiteside MD;  Location: [facility];  Service: General    WISDOM TOOTH EXTRACTION Bilateral      Social History   Social History     Substance and Sexual Activity   Alcohol Use Yes    Alcohol/week: 2 0 standard drinks    Types: 2 Glasses of wine per week    Frequency: 2-4 times a month    Drinks per session: 1 or 2    Binge frequency: Never    Comment: socially     Social History     Substance and Sexual Activity   Drug Use No     Social History     Tobacco Use   Smoking Status Never Smoker   Smokeless Tobacco Never Used     Family History: non-contributory    Meds/Allergies   all medications and allergies reviewed     Current Outpatient Medications:     acetaminophen (TYLENOL) 325 mg tablet, Take 2 tablets by mouth every 6 (six) hours as needed for headaches or fever, Disp: 30 tablet, Rfl: 0    albuterol (PROVENTIL HFA,VENTOLIN HFA) 90 mcg/act inhaler, Inhale 2 puffs every 6 (six) hours as needed for wheezing, Disp: , Rfl:     ibuprofen (MOTRIN) 600 mg tablet, Take 1 tablet (600 mg total) by mouth every 6 (six) hours as needed for mild pain, Disp: 30 tablet, Rfl: 0  Allergies   Allergen Reactions    Penicillins Anaphylaxis and Rash       Objective     Current Vitals:   Blood Pressure: 98/56 (07/02/19 1025)  Pulse: 86 (07/02/19 1025)  Temperature: 98 1 °F (36 7 °C) (07/02/19 1025)  Temp Source: Tympanic (07/02/19 1025)  Respirations: 12 (07/02/19 1025)  Height: 5' 3" (160 cm) (07/02/19 1025)  Weight - Scale: 59 9 kg (132 lb) (07/02/19 1025)      Invasive Devices     None                 Physical Exam   Abdominal:   Abdomen is soft, nondistended and nontender  Incisions are well-healed without evidence of incisional hernia  There is no evidence of recurrence of the hernia  The patient has a small seroma which she was reassured it was disappeared in the next couple months  Nursing note and vitals reviewed

## 2019-10-07 ENCOUNTER — ANNUAL EXAM (OUTPATIENT)
Dept: OBGYN CLINIC | Facility: CLINIC | Age: 29
End: 2019-10-07
Payer: COMMERCIAL

## 2019-10-07 VITALS
WEIGHT: 137.38 LBS | HEIGHT: 63 IN | BODY MASS INDEX: 24.34 KG/M2 | DIASTOLIC BLOOD PRESSURE: 70 MMHG | SYSTOLIC BLOOD PRESSURE: 112 MMHG

## 2019-10-07 DIAGNOSIS — Z01.419 ENCOUNTER FOR GYNECOLOGICAL EXAMINATION WITHOUT ABNORMAL FINDING: Primary | ICD-10-CM

## 2019-10-07 PROBLEM — K42.9 UMBILICAL HERNIA WITHOUT OBSTRUCTION AND WITHOUT GANGRENE: Status: RESOLVED | Noted: 2017-01-05 | Resolved: 2019-10-07

## 2019-10-07 PROBLEM — K42.0 INCARCERATED UMBILICAL HERNIA: Status: RESOLVED | Noted: 2019-06-05 | Resolved: 2019-10-07

## 2019-10-07 PROBLEM — D64.9 ANEMIA: Status: RESOLVED | Noted: 2018-05-02 | Resolved: 2019-10-07

## 2019-10-07 PROCEDURE — G0145 SCR C/V CYTO,THINLAYER,RESCR: HCPCS | Performed by: OBSTETRICS & GYNECOLOGY

## 2019-10-07 PROCEDURE — S0612 ANNUAL GYNECOLOGICAL EXAMINA: HCPCS | Performed by: OBSTETRICS & GYNECOLOGY

## 2019-10-07 NOTE — ASSESSMENT & PLAN NOTE
Pap collected today    Encourage healthy diet, exercise, Calcium 1200mg per day and at least 800 iu Vitamin D daily

## 2019-10-07 NOTE — PATIENT INSTRUCTIONS

## 2019-10-07 NOTE — PROGRESS NOTES
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  Oldest daughter is playing baseball  Doing well  Middle son - 1yo   [de-identified] 7 months old (son)    All doing well  Works at [address] [place] [address] Avenue   Gynecologic Exam   The patient's pertinent negatives include no genital itching, genital lesions, genital odor, genital rash, pelvic pain, vaginal bleeding or vaginal discharge  The patient is experiencing no pain  Pertinent negatives include no chills, constipation, diarrhea, fever, nausea, sore throat or vomiting  The following portions of the patient's history were reviewed and updated as appropriate:   She  has a past medical history of Anemia (2018), Asthma, Migraine, Normal delivery,  (spontaneous vaginal delivery) (), Umbilical hernia without obstruction and without gangrene, Varicella, and Visual impairment  She   Patient Active Problem List    Diagnosis Date Noted    Incarcerated umbilical hernia     Anemia 2018    Asthma     Umbilical hernia without obstruction and without gangrene 2017     She  has a past surgical history that includes [place] tooth extraction (Bilateral, 1244) and UMBILICAL HERNIA REPAIR LAPAROSCOPIC (N/A, 2019)  Her family history includes Alcohol abuse in her paternal uncle; Anxiety disorder in her sister;  Arthritis in her father and mother; Asthma in her mother and sister; Cancer in her maternal aunt, maternal grandfather, paternal grandfather, and paternal grandmother; Colon cancer in her paternal grandfather; Diabetes in her maternal grandfather and maternal grandmother; Hearing loss in her maternal grandfather and paternal grandfather; Heart disease in her paternal grandfather, paternal uncle, and paternal uncle; Hyperlipidemia in her paternal grandfather; Hypertension in her maternal grandfather and paternal grandfather; Irregular heart beat in her daughter; Balaji Ortiz / Jesika in her mother; Thyroid cancer in her maternal aunt; Thyroid disease in her maternal aunt  She  reports that she has never smoked  She has never used smokeless tobacco  She reports that she drinks about 2 0 standard drinks of alcohol per week  She reports that she does not use drugs  Current Outpatient Medications   Medication Sig Dispense Refill    acetaminophen (TYLENOL) 325 mg tablet Take 2 tablets by mouth every 6 (six) hours as needed for headaches or fever 30 tablet 0    albuterol (PROVENTIL HFA,VENTOLIN HFA) 90 mcg/act inhaler Inhale 2 puffs every 6 (six) hours as needed for wheezing      ibuprofen (MOTRIN) 600 mg tablet Take 1 tablet (600 mg total) by mouth every 6 (six) hours as needed for mild pain 30 tablet 0     No current facility-administered medications for this visit  Current Outpatient Medications on File Prior to Visit   Medication Sig    acetaminophen (TYLENOL) 325 mg tablet Take 2 tablets by mouth every 6 (six) hours as needed for headaches or fever    albuterol (PROVENTIL HFA,VENTOLIN HFA) 90 mcg/act inhaler Inhale 2 puffs every 6 (six) hours as needed for wheezing    ibuprofen (MOTRIN) 600 mg tablet Take 1 tablet (600 mg total) by mouth every 6 (six) hours as needed for mild pain     No current facility-administered medications on file prior to visit  She is allergic to penicillins       Review of Systems   Constitutional: Negative for activity change, appetite change, chills, fatigue and fever  HENT: Negative for rhinorrhea, sneezing and sore throat  Eyes: Negative for visual disturbance  Respiratory: Negative for cough, shortness of breath and wheezing  Cardiovascular: Negative for chest pain, palpitations and leg swelling  Gastrointestinal: Negative for abdominal distention, constipation, diarrhea, nausea and vomiting  Genitourinary: Negative for difficulty urinating, pelvic pain and vaginal discharge  Neurological: Negative for syncope and light-headedness  Objective:      Ht 5' 3" (1 6 m)   Breastfeeding? No   BMI 23 38 kg/m²          Physical Exam   Constitutional: She is oriented to person, place, and time  Pulmonary/Chest: Right breast exhibits no inverted nipple, no mass, no nipple discharge, no skin change and no tenderness  Left breast exhibits no inverted nipple, no mass, no nipple discharge, no skin change and no tenderness  No breast tenderness, discharge or bleeding  Breasts are symmetrical    Genitourinary: Vagina normal and uterus normal  No breast tenderness, discharge or bleeding  There is no rash, tenderness, lesion or injury on the right labia  There is no rash, tenderness, lesion or injury on the left labia  Uterus is not deviated, not enlarged, not fixed and not tender  Cervix exhibits no motion tenderness, no discharge and no friability  Right adnexum displays no mass, no tenderness and no fullness  Left adnexum displays no mass, no tenderness and no fullness  No tenderness or bleeding in the vagina  No vaginal discharge found  Neurological: She is alert and oriented to person, place, and time

## 2019-10-14 ENCOUNTER — TELEPHONE (OUTPATIENT)
Dept: OBGYN CLINIC | Facility: CLINIC | Age: 29
End: 2019-10-14

## 2019-10-14 LAB
LAB AP GYN PRIMARY INTERPRETATION: NORMAL
LAB AP LMP: NORMAL
Lab: NORMAL

## 2019-10-14 NOTE — TELEPHONE ENCOUNTER
Called patient to advise of neg pap result  Unable to physically speak w/ patient so left detailed vm advising pt of neg result  Also advised if any further questions or concerns to give our office a call back

## 2019-10-14 NOTE — TELEPHONE ENCOUNTER
----- Message from Hernan Pak MD sent at 10/14/2019  1:10 PM EDT -----  Please call patient  Pap and HPV are negative

## 2019-12-13 ENCOUNTER — TELEPHONE (OUTPATIENT)
Dept: OBGYN CLINIC | Facility: CLINIC | Age: 29
End: 2019-12-13

## 2019-12-13 DIAGNOSIS — Z30.02 ENCOUNTER FOR COUNSELING AND INSTRUCTION IN NATURAL FAMILY PLANNING TO AVOID PREGNANCY: Primary | ICD-10-CM

## 2019-12-13 NOTE — TELEPHONE ENCOUNTER
Pt wants to go on birth control but is concerned because she get migraines with aura every month around her period, does she need appt or can you advise which pill would be good for her

## 2019-12-13 NOTE — TELEPHONE ENCOUNTER
Pt sees KTM she would like a call to discuss BC options,  She was last on [facility] a few yrs ago after her delivery,   pls call to advise as pt wanted to discuss first before making an appt, Thanks

## 2019-12-13 NOTE — TELEPHONE ENCOUNTER
Options for her would include:  IUD - either mirena or paraguard  These are excellent contraception options however, her hormonal levels will not be effective - which means that her headaches may not change- for better or worse  OC - I would prefer to avoid estrogen containing pills or use the lowest possible dose  A progestin only pill is a option but it must be taken daily at the same time  IT is not very forgiving  IF you miss a dose there could be breakthrough bleeding as well as unplanned pregnancy  One option would be lo lo estrin which has only 10mcg of estrogen and women seem to do well on this but break through bleeding is common for the first 3-6 months    IM depoprovera is also an option- every three months, this may help a lot with her migraines because it keeps her hormonal levels steady      Please let me know if she would like more information- we can always schedule an office visit

## 2019-12-16 NOTE — TELEPHONE ENCOUNTER
Pt contacted and advised as directed  Pt stated she would like the depo injection and would like to schedule  Last annual 10/07/19   Please sing off on mediation if you agree pt scheduled for first injection on 01/02 at 8 am

## 2019-12-17 RX ORDER — MEDROXYPROGESTERONE ACETATE 150 MG/ML
150 INJECTION, SUSPENSION INTRAMUSCULAR
Qty: 1 ML | Refills: 4 | Status: SHIPPED | OUTPATIENT
Start: 2019-12-17 | End: 2020-10-12 | Stop reason: SDUPTHER

## 2020-01-02 ENCOUNTER — CLINICAL SUPPORT (OUTPATIENT)
Dept: OBGYN CLINIC | Facility: CLINIC | Age: 30
End: 2020-01-02
Payer: COMMERCIAL

## 2020-01-02 VITALS — DIASTOLIC BLOOD PRESSURE: 68 MMHG | WEIGHT: 139 LBS | BODY MASS INDEX: 24.62 KG/M2 | SYSTOLIC BLOOD PRESSURE: 112 MMHG

## 2020-01-02 DIAGNOSIS — Z30.013 INITIATION OF DEPO PROVERA: Primary | ICD-10-CM

## 2020-01-02 LAB — SL AMB POCT URINE HCG: NEGATIVE

## 2020-01-02 PROCEDURE — 96372 THER/PROPH/DIAG INJ SC/IM: CPT | Performed by: NURSE PRACTITIONER

## 2020-01-02 PROCEDURE — 81025 URINE PREGNANCY TEST: CPT | Performed by: NURSE PRACTITIONER

## 2020-01-02 RX ORDER — PROPRANOLOL HYDROCHLORIDE 10 MG/1
TABLET ORAL
COMMUNITY
Start: 2019-12-20

## 2020-01-02 RX ORDER — MEDROXYPROGESTERONE ACETATE 150 MG/ML
150 INJECTION, SUSPENSION INTRAMUSCULAR ONCE
Status: COMPLETED | OUTPATIENT
Start: 2020-01-02 | End: 2020-01-02

## 2020-01-02 RX ADMIN — MEDROXYPROGESTERONE ACETATE 150 MG: 150 INJECTION, SUSPENSION INTRAMUSCULAR at 14:27

## 2020-01-02 NOTE — PROGRESS NOTES
Pt here to start depo provera, denies concerns or question  UPT negative today, depo given IM in right gluteal area, tolerated well   Next appt scheduled

## 2020-03-24 ENCOUNTER — CLINICAL SUPPORT (OUTPATIENT)
Dept: OBGYN CLINIC | Facility: CLINIC | Age: 30
End: 2020-03-24
Payer: COMMERCIAL

## 2020-03-24 VITALS
DIASTOLIC BLOOD PRESSURE: 80 MMHG | BODY MASS INDEX: 25.87 KG/M2 | HEIGHT: 63 IN | WEIGHT: 146 LBS | SYSTOLIC BLOOD PRESSURE: 110 MMHG

## 2020-03-24 DIAGNOSIS — Z30.42 ENCOUNTER FOR DEPO-PROVERA CONTRACEPTION: Primary | ICD-10-CM

## 2020-03-24 DIAGNOSIS — Z30.013 INITIATION OF DEPO PROVERA: ICD-10-CM

## 2020-03-24 DIAGNOSIS — Z23 NEED FOR TDAP VACCINATION: ICD-10-CM

## 2020-03-24 PROCEDURE — 96372 THER/PROPH/DIAG INJ SC/IM: CPT | Performed by: OBSTETRICS & GYNECOLOGY

## 2020-03-24 RX ORDER — MEDROXYPROGESTERONE ACETATE 150 MG/ML
150 INJECTION, SUSPENSION INTRAMUSCULAR ONCE
Status: COMPLETED | OUTPATIENT
Start: 2020-03-24 | End: 2020-03-24

## 2020-03-24 RX ADMIN — MEDROXYPROGESTERONE ACETATE 150 MG: 150 INJECTION, SUSPENSION INTRAMUSCULAR at 15:03

## 2020-06-11 ENCOUNTER — CLINICAL SUPPORT (OUTPATIENT)
Dept: OBGYN CLINIC | Facility: CLINIC | Age: 30
End: 2020-06-11
Payer: COMMERCIAL

## 2020-06-11 VITALS
WEIGHT: 150.4 LBS | TEMPERATURE: 97.4 F | SYSTOLIC BLOOD PRESSURE: 104 MMHG | BODY MASS INDEX: 26.64 KG/M2 | DIASTOLIC BLOOD PRESSURE: 72 MMHG

## 2020-06-11 DIAGNOSIS — Z30.42 ENCOUNTER FOR DEPO-PROVERA CONTRACEPTION: Primary | ICD-10-CM

## 2020-06-11 PROCEDURE — 96372 THER/PROPH/DIAG INJ SC/IM: CPT | Performed by: NURSE PRACTITIONER

## 2020-06-11 RX ORDER — MEDROXYPROGESTERONE ACETATE 150 MG/ML
150 INJECTION, SUSPENSION INTRAMUSCULAR ONCE
Status: COMPLETED | OUTPATIENT
Start: 2020-06-11 | End: 2020-06-11

## 2020-06-11 RX ADMIN — MEDROXYPROGESTERONE ACETATE 150 MG: 150 INJECTION, SUSPENSION INTRAMUSCULAR at 07:12

## 2020-08-28 ENCOUNTER — CLINICAL SUPPORT (OUTPATIENT)
Dept: OBGYN CLINIC | Facility: CLINIC | Age: 30
End: 2020-08-28
Payer: COMMERCIAL

## 2020-08-28 VITALS
HEIGHT: 63 IN | DIASTOLIC BLOOD PRESSURE: 62 MMHG | SYSTOLIC BLOOD PRESSURE: 110 MMHG | TEMPERATURE: 97.9 F | BODY MASS INDEX: 28.77 KG/M2 | WEIGHT: 162.38 LBS

## 2020-08-28 DIAGNOSIS — Z30.42 ENCOUNTER FOR DEPO-PROVERA CONTRACEPTION: Primary | ICD-10-CM

## 2020-08-28 PROCEDURE — 96372 THER/PROPH/DIAG INJ SC/IM: CPT | Performed by: NURSE PRACTITIONER

## 2020-08-28 RX ORDER — MEDROXYPROGESTERONE ACETATE 150 MG/ML
150 INJECTION, SUSPENSION INTRAMUSCULAR ONCE
Status: COMPLETED | OUTPATIENT
Start: 2020-08-28 | End: 2020-08-28

## 2020-08-28 RX ADMIN — MEDROXYPROGESTERONE ACETATE 150 MG: 150 INJECTION, SUSPENSION INTRAMUSCULAR at 07:58

## 2020-08-28 NOTE — PROGRESS NOTES
Patient here for depo provera injection tolerated injection well given to pt in her right gluteus     Next depo-provera due 11/13-11/27  Ndc#: [id number]  Lot #: [id number]  Exp date: 08/2022

## 2020-10-12 ENCOUNTER — ANNUAL EXAM (OUTPATIENT)
Dept: OBGYN CLINIC | Facility: CLINIC | Age: 30
End: 2020-10-12
Payer: COMMERCIAL

## 2020-10-12 VITALS
DIASTOLIC BLOOD PRESSURE: 80 MMHG | BODY MASS INDEX: 28.7 KG/M2 | TEMPERATURE: 98.4 F | WEIGHT: 162 LBS | SYSTOLIC BLOOD PRESSURE: 118 MMHG

## 2020-10-12 DIAGNOSIS — Z01.419 ENCOUNTER FOR GYNECOLOGICAL EXAMINATION WITHOUT ABNORMAL FINDING: Primary | ICD-10-CM

## 2020-10-12 DIAGNOSIS — Z30.02 ENCOUNTER FOR COUNSELING AND INSTRUCTION IN NATURAL FAMILY PLANNING TO AVOID PREGNANCY: ICD-10-CM

## 2020-10-12 PROBLEM — Z86.69 HX OF MIGRAINE HEADACHES: Status: ACTIVE | Noted: 2019-12-20

## 2020-10-12 PROBLEM — L98.9 INFLAMMATORY DERMATOSIS: Status: ACTIVE | Noted: 2020-01-15

## 2020-10-12 PROCEDURE — S0612 ANNUAL GYNECOLOGICAL EXAMINA: HCPCS | Performed by: OBSTETRICS & GYNECOLOGY

## 2020-10-12 RX ORDER — MEDROXYPROGESTERONE ACETATE 150 MG/ML
150 INJECTION, SUSPENSION INTRAMUSCULAR
Qty: 1 ML | Refills: 4 | Status: SHIPPED | OUTPATIENT
Start: 2020-10-12 | End: 2021-10-30

## 2020-12-04 ENCOUNTER — CLINICAL SUPPORT (OUTPATIENT)
Dept: OBGYN CLINIC | Facility: CLINIC | Age: 30
End: 2020-12-04
Payer: COMMERCIAL

## 2020-12-04 VITALS — BODY MASS INDEX: 29.02 KG/M2 | SYSTOLIC BLOOD PRESSURE: 104 MMHG | WEIGHT: 163.8 LBS | DIASTOLIC BLOOD PRESSURE: 80 MMHG

## 2020-12-04 DIAGNOSIS — Z30.42 ENCOUNTER FOR DEPO-PROVERA CONTRACEPTION: Primary | ICD-10-CM

## 2020-12-04 PROCEDURE — 96372 THER/PROPH/DIAG INJ SC/IM: CPT | Performed by: NURSE PRACTITIONER

## 2020-12-04 RX ORDER — MEDROXYPROGESTERONE ACETATE 150 MG/ML
150 INJECTION, SUSPENSION INTRAMUSCULAR ONCE
Status: COMPLETED | OUTPATIENT
Start: 2020-12-04 | End: 2020-12-04

## 2020-12-04 RX ADMIN — MEDROXYPROGESTERONE ACETATE 150 MG: 150 INJECTION, SUSPENSION INTRAMUSCULAR at 07:42

## 2021-03-04 ENCOUNTER — CLINICAL SUPPORT (OUTPATIENT)
Dept: OBGYN CLINIC | Facility: CLINIC | Age: 31
End: 2021-03-04
Payer: COMMERCIAL

## 2021-03-04 DIAGNOSIS — Z30.42 ENCOUNTER FOR DEPO-PROVERA CONTRACEPTION: Primary | ICD-10-CM

## 2021-03-04 PROCEDURE — 96372 THER/PROPH/DIAG INJ SC/IM: CPT | Performed by: NURSE PRACTITIONER

## 2021-03-04 RX ORDER — MEDROXYPROGESTERONE ACETATE 150 MG/ML
150 INJECTION, SUSPENSION INTRAMUSCULAR ONCE
Status: COMPLETED | OUTPATIENT
Start: 2021-03-04 | End: 2021-03-04

## 2021-03-04 RX ADMIN — MEDROXYPROGESTERONE ACETATE 150 MG: 150 INJECTION, SUSPENSION INTRAMUSCULAR at 08:23

## 2021-03-04 NOTE — PROGRESS NOTES
Patient is here for depo injection, administered on right gluteus    Last depo 12/4/20  Annual 10/12/20  ZCB:[address]-9  XVI:[id number]  Exp: 08/31/21  Next: 5/20-6/3

## 2021-03-10 DIAGNOSIS — Z23 ENCOUNTER FOR IMMUNIZATION: ICD-10-CM

## 2021-03-20 ENCOUNTER — IMMUNIZATIONS (OUTPATIENT)
Dept: FAMILY MEDICINE CLINIC | Facility: HOSPITAL | Age: 31
End: 2021-03-20

## 2021-03-20 DIAGNOSIS — Z23 ENCOUNTER FOR IMMUNIZATION: Primary | ICD-10-CM

## 2021-03-20 PROCEDURE — 91300 SARS-COV-2 / COVID-19 MRNA VACCINE (PFIZER-BIONTECH) 30 MCG: CPT

## 2021-03-20 PROCEDURE — 0001A SARS-COV-2 / COVID-19 MRNA VACCINE (PFIZER-BIONTECH) 30 MCG: CPT

## 2021-04-10 ENCOUNTER — IMMUNIZATIONS (OUTPATIENT)
Dept: FAMILY MEDICINE CLINIC | Facility: HOSPITAL | Age: 31
End: 2021-04-10

## 2021-04-10 DIAGNOSIS — Z23 ENCOUNTER FOR IMMUNIZATION: Primary | ICD-10-CM

## 2021-04-10 PROCEDURE — 91300 SARS-COV-2 / COVID-19 MRNA VACCINE (PFIZER-BIONTECH) 30 MCG: CPT

## 2021-04-10 PROCEDURE — 0002A SARS-COV-2 / COVID-19 MRNA VACCINE (PFIZER-BIONTECH) 30 MCG: CPT

## 2021-05-19 RX ORDER — MEDROXYPROGESTERONE ACETATE 150 MG/ML
150 INJECTION, SUSPENSION INTRAMUSCULAR ONCE
Status: CANCELLED | OUTPATIENT
Start: 2021-05-19 | End: 2021-05-19

## 2021-05-20 ENCOUNTER — CLINICAL SUPPORT (OUTPATIENT)
Dept: OBGYN CLINIC | Facility: CLINIC | Age: 31
End: 2021-05-20
Payer: COMMERCIAL

## 2021-05-20 VITALS
SYSTOLIC BLOOD PRESSURE: 100 MMHG | HEIGHT: 63 IN | WEIGHT: 172.6 LBS | DIASTOLIC BLOOD PRESSURE: 74 MMHG | BODY MASS INDEX: 30.58 KG/M2

## 2021-05-20 VITALS
SYSTOLIC BLOOD PRESSURE: 100 MMHG | HEIGHT: 63 IN | BODY MASS INDEX: 30.58 KG/M2 | WEIGHT: 172.6 LBS | DIASTOLIC BLOOD PRESSURE: 74 MMHG

## 2021-05-20 DIAGNOSIS — Z30.42 ENCOUNTER FOR DEPO-PROVERA CONTRACEPTION: Primary | ICD-10-CM

## 2021-05-20 PROCEDURE — 96372 THER/PROPH/DIAG INJ SC/IM: CPT | Performed by: NURSE PRACTITIONER

## 2021-05-20 RX ORDER — PANTOPRAZOLE SODIUM 40 MG/1
40 TABLET, DELAYED RELEASE ORAL DAILY
COMMUNITY
Start: 2021-03-25

## 2021-05-20 RX ORDER — MEDROXYPROGESTERONE ACETATE 150 MG/ML
150 INJECTION, SUSPENSION INTRAMUSCULAR ONCE
Status: COMPLETED | OUTPATIENT
Start: 2021-05-20 | End: 2021-05-20

## 2021-05-20 RX ADMIN — MEDROXYPROGESTERONE ACETATE 150 MG: 150 INJECTION, SUSPENSION INTRAMUSCULAR at 07:31

## 2021-05-20 NOTE — PROGRESS NOTES
Pt is here for Depo Provera injection  Her last dose was 3/4/2021  Her annual exam was on 10/12/2020  Depo given in L Buttock  Tolerated well  Lot [id number] Exp 03/30/2023  Next dose due 8/5/21 - 8/19/21

## 2021-08-05 ENCOUNTER — CLINICAL SUPPORT (OUTPATIENT)
Dept: OBGYN CLINIC | Facility: CLINIC | Age: 31
End: 2021-08-05
Payer: COMMERCIAL

## 2021-08-05 VITALS
HEIGHT: 63 IN | BODY MASS INDEX: 30.48 KG/M2 | DIASTOLIC BLOOD PRESSURE: 74 MMHG | SYSTOLIC BLOOD PRESSURE: 118 MMHG | WEIGHT: 172 LBS

## 2021-08-05 DIAGNOSIS — Z30.42 ENCOUNTER FOR DEPO-PROVERA CONTRACEPTION: Primary | ICD-10-CM

## 2021-08-05 PROBLEM — Z01.419 ENCOUNTER FOR GYNECOLOGICAL EXAMINATION WITHOUT ABNORMAL FINDING: Status: RESOLVED | Noted: 2019-10-07 | Resolved: 2021-08-05

## 2021-08-05 PROCEDURE — 96372 THER/PROPH/DIAG INJ SC/IM: CPT | Performed by: NURSE PRACTITIONER

## 2021-08-05 RX ORDER — MEDROXYPROGESTERONE ACETATE 150 MG/ML
150 INJECTION, SUSPENSION INTRAMUSCULAR ONCE
Status: COMPLETED | OUTPATIENT
Start: 2021-08-05 | End: 2021-08-05

## 2021-08-05 RX ADMIN — MEDROXYPROGESTERONE ACETATE 150 MG: 150 INJECTION, SUSPENSION INTRAMUSCULAR at 08:05

## 2021-08-05 NOTE — PATIENT INSTRUCTIONS
Medroxyprogesterone (By injection)   Medroxyprogesterone (nk-ulwh-se-proe-PRASHANTH-ter-one)  Prevents pregnancy  Also treats endometriosis and is used with other medicines to help relieve symptoms of cancer, including uterine or kidney cancer  Brand Name(s): Depo-Provera, Depo-Provera Contraceptive, Depo-SubQ Provera 104, medroxyPROGESTERone acetate Novaplus   There may be other brand names for this medicine  When This Medicine Should Not Be Used: This medicine is not right for everyone  You should not receive it if you had an allergic reaction to medroxyprogesterone or if you have a history of breast cancer or blood clots (including heart attack or stroke)  In most cases, you should not use this medicine while you are pregnant  How to Use This Medicine:   Injectable  · A nurse or other health provider will give you this medicine  This medicine is given as a shot into a muscle (usually in the buttocks or upper arm) or just under the skin  · Your exact treatment schedule depends on the reason you are using this medicine  You doctor will explain your personal schedule  ? For treatment of cancer symptoms, you may start with a shot once per week  You may need fewer shots as your treatment goes forward  ? For birth control or endometriosis, you will need a shot every 3 months (13 weeks)  ? You might need to have the first shot during the first 5 days of your normal menstrual period, to make sure you are not pregnant  If you have just had a baby, you may receive a shot 5 days after birth if you are not breastfeeding or 6 weeks after birth if you are breastfeeding  · Read and follow the patient instructions that come with this medicine  Talk to your doctor or pharmacist if you have any questions  · Missed dose: You must receive a shot every 3 months if you want to prevent pregnancy   Talk to your doctor or pharmacist if you do not receive your medicine on time, because you may need another form of birth control  Drugs and Foods to Avoid:   Ask your doctor or pharmacist before using any other medicine, including over-the-counter medicines, vitamins, and herbal products  · Some medicines can affect how medroxyprogesterone works  Tell your doctor if you are using any of the following:  ? Aminoglutethimide, bosentan, carbamazepine, felbamate, griseofulvin, mitotane, modafinil, nefazodone, oxcarbazepine, phenobarbital, phenytoin, rifabutin, rifampin, rifapentine, Manisha's wort, topiramate  ? Medicine to treat an infection (including clarithromycin, itraconazole, ketoconazole, telithromycin, voriconazole)  ? Medicine to treat HIV/AIDS (including atazanavir, efavirenz, indinavir, nelfinavir, ritonavir, saquinavir)  Warnings While Using This Medicine:   · Tell your doctor right away if you think you have become pregnant  · Tell your doctor if you are breastfeeding, or if you have kidney disease, liver disease, asthma, diabetes, heart disease, seizures, migraine headaches, an eating disorder, osteoporosis, or a history of depression  Tell your doctor if you smoke  · This medicine may cause the following problems:  ? Weak or thin bones, especially with long-term use  ? Blood clots, which could lead to stroke, heart attack, eye or vision problems, or other serious problems  ? Possible increased risk of breast cancer  ? Injection site reactions  ? Liver problems  ? Changes in menstrual periods  ? Fluid retention (edema) and weight gain  · You should not use this medicine for long-term birth control unless you cannot use any other form of birth control  · This medicine will not protect you from HIV/AIDS or other sexually transmitted diseases  · Tell any doctor or dentist who treats you that you are using this medicine  This medicine may affect certain medical test results  · Your doctor will do lab tests at regular visits to check on the effects of this medicine  Keep all appointments    Possible Side Effects While Using This Medicine:   Call your doctor right away if you notice any of these side effects:  · Allergic reaction: Itching or hives, swelling in your face or hands, swelling or tingling in your mouth or throat, chest tightness, trouble breathing  · Chest pain, trouble breathing, or coughing up blood  · Dark urine or pale stools, nausea, vomiting, loss of appetite, stomach pain, yellow skin or eyes  · Heavy or nonstop vaginal bleeding  · Loss of vision, double vision  · Numbness or weakness on one side of your body, sudden or severe headache, problems with vision, speech, or walking  · Rapid weight gain, swelling in your hands, ankles, or feet  · Seizures  If you notice these less serious side effects, talk with your doctor:   · Light or missed monthly periods, spotting between periods  · Nervousness or dizziness  · Pain, redness, burning, swelling, or a lump under your skin where the shot was given  If you notice other side effects that you think are caused by this medicine, tell your doctor  Call your doctor for medical advice about side effects  You may report side effects to FDA at 0-624-FDA-8739  © Copyright [employer] 2021 Information is for End User's use only and may not be sold, redistributed or otherwise used for commercial purposes  The above information is an  only  It is not intended as medical advice for individual conditions or treatments  Talk to your doctor, nurse or pharmacist before following any medical regimen to see if it is safe and effective for you

## 2021-08-05 NOTE — PROGRESS NOTES
Pt here for depo, last injection given 5/20/21, IM in left buttock, no complications documented  Annual exam done with KTM on 10/12/20      Pt has next injection and yearly scheduled in 10/2021,pt denies any problems with depo, injection given IM in right buttock, tolerated well

## 2021-08-23 ENCOUNTER — TELEPHONE (OUTPATIENT)
Dept: OBGYN CLINIC | Facility: CLINIC | Age: 31
End: 2021-08-23

## 2021-08-23 ENCOUNTER — PATIENT MESSAGE (OUTPATIENT)
Dept: OBGYN CLINIC | Facility: CLINIC | Age: 31
End: 2021-08-23

## 2021-08-23 NOTE — TELEPHONE ENCOUNTER
----- Message from Kimberly Dean sent at 8/23/2021 10:06 AM EDT -----  Regarding: Prescription Question  Contact: [phone]  Good Morning,  I have not been happy with my weight lately and have been watching what I eat, exercising, doing whatever I can to loose weight  And so far   not working  I would really like to get back to what I was a few years ago    89ish  If I get off the Depo, do you see that being a possibility? I like not having my period or the migraines, but I am really not feeling myself with all the extra weight I have gained    Thank you for your advice in this matter  Kimberly Dean

## 2021-10-30 DIAGNOSIS — Z30.02 ENCOUNTER FOR COUNSELING AND INSTRUCTION IN NATURAL FAMILY PLANNING TO AVOID PREGNANCY: ICD-10-CM

## 2021-10-30 RX ORDER — MEDROXYPROGESTERONE ACETATE 150 MG/ML
INJECTION, SUSPENSION INTRAMUSCULAR
Qty: 1 ML | Refills: 4 | Status: SHIPPED | OUTPATIENT
Start: 2021-10-30 | End: 2021-11-02 | Stop reason: ALTCHOICE

## 2021-11-02 ENCOUNTER — ANNUAL EXAM (OUTPATIENT)
Dept: OBGYN CLINIC | Facility: CLINIC | Age: 31
End: 2021-11-02
Payer: COMMERCIAL

## 2021-11-02 VITALS — BODY MASS INDEX: 30.96 KG/M2 | SYSTOLIC BLOOD PRESSURE: 118 MMHG | DIASTOLIC BLOOD PRESSURE: 78 MMHG | WEIGHT: 174.8 LBS

## 2021-11-02 DIAGNOSIS — Z01.419 ENCOUNTER FOR ANNUAL ROUTINE GYNECOLOGICAL EXAMINATION: Primary | ICD-10-CM

## 2021-11-02 PROCEDURE — S0612 ANNUAL GYNECOLOGICAL EXAMINA: HCPCS | Performed by: OBSTETRICS & GYNECOLOGY

## 2021-11-02 PROCEDURE — G0145 SCR C/V CYTO,THINLAYER,RESCR: HCPCS | Performed by: OBSTETRICS & GYNECOLOGY

## 2021-11-02 PROCEDURE — G0476 HPV COMBO ASSAY CA SCREEN: HCPCS | Performed by: OBSTETRICS & GYNECOLOGY

## 2021-11-05 LAB
HPV HR 12 DNA CVX QL NAA+PROBE: NEGATIVE
HPV16 DNA CVX QL NAA+PROBE: NEGATIVE
HPV18 DNA CVX QL NAA+PROBE: NEGATIVE

## 2021-11-08 LAB
LAB AP GYN PRIMARY INTERPRETATION: NORMAL
Lab: NORMAL

## 2023-02-05 ENCOUNTER — HOSPITAL ENCOUNTER (EMERGENCY)
Facility: HOSPITAL | Age: 33
Discharge: HOME | End: 2023-02-05
Attending: EMERGENCY MEDICINE

## 2023-02-05 ENCOUNTER — APPOINTMENT (EMERGENCY)
Dept: RADIOLOGY | Facility: HOSPITAL | Age: 33
End: 2023-02-05

## 2023-02-05 VITALS
RESPIRATION RATE: 17 BRPM | HEIGHT: 63 IN | SYSTOLIC BLOOD PRESSURE: 119 MMHG | WEIGHT: 150 LBS | DIASTOLIC BLOOD PRESSURE: 64 MMHG | HEART RATE: 83 BPM | OXYGEN SATURATION: 99 % | BODY MASS INDEX: 26.58 KG/M2 | TEMPERATURE: 98.7 F

## 2023-02-05 DIAGNOSIS — S60.041A CONTUSION OF RIGHT RING FINGER WITHOUT DAMAGE TO NAIL, INITIAL ENCOUNTER: Primary | ICD-10-CM

## 2023-02-05 RX ORDER — IBUPROFEN 600 MG/1
600 TABLET ORAL ONCE
Status: COMPLETED | OUTPATIENT
Start: 2023-02-05 | End: 2023-02-05

## 2023-02-05 RX ORDER — HYDROCODONE BITARTRATE AND ACETAMINOPHEN 5; 325 MG/1; MG/1
1 TABLET ORAL EVERY 6 HOURS PRN
Qty: 12 TABLET | Refills: 0 | Status: SHIPPED | OUTPATIENT
Start: 2023-02-05

## 2023-02-05 RX ADMIN — IBUPROFEN 600 MG: 600 TABLET, FILM COATED ORAL at 10:39

## 2023-02-05 NOTE — ED PROVIDER NOTES
History  Chief Complaint   Patient presents with   • Finger Injury     Pt c/o pain in right ring finger after hitting it against a horse stall today      36-year-old female that sustained a right ring finger injury after accidentally hitting it in her horse barn  She has ecchymosis over the distal pad of the fourth digit          Prior to Admission Medications   Prescriptions Last Dose Informant Patient Reported?  Taking?   acetaminophen (TYLENOL) 325 mg tablet   No No   Sig: Take 2 tablets by mouth every 6 (six) hours as needed for headaches or fever   albuterol (PROVENTIL HFA,VENTOLIN HFA) 90 mcg/act inhaler   Yes No   Sig: Inhale 2 puffs every 6 (six) hours as needed for wheezing   pantoprazole (PROTONIX) 40 mg tablet   Yes No   Sig: Take 40 mg by mouth daily   propranolol (INDERAL) 10 mg tablet   Yes No      Facility-Administered Medications: None       Past Medical History:   Diagnosis Date   • Anemia 2018    mild    • Asthma     excercise  induced   • Migraine     ocular migraine with pregnancy    • Normal delivery      daughter, 2016 son   •  (spontaneous vaginal delivery)    • Umbilical hernia without obstruction and without gangrene     per pt plan of care is to have surgery after delivery    • Varicella     vaccine  x2   • Visual impairment     eyeglasses as needed        Past Surgical History:   Procedure Laterality Date   • UMBILICAL HERNIA REPAIR LAPAROSCOPIC N/A 2019    Procedure: LAPAROSCOPIC UMBILICAL HERNIA REPAIR;  Surgeon: Misha Bush MD;  Location: [facility];  Service: General   • WISDOM TOOTH EXTRACTION Bilateral        Family History   Problem Relation Age of Onset   • Asthma Mother    • Miscarriages / [place] Mother    • Arthritis Mother    • Arthritis Father    • Asthma Sister    • Anxiety disorder Sister    • Heart disease Paternal Uncle    • Alcohol abuse Paternal Uncle    • Cancer Maternal Grandfather         prostate   • Hearing loss Maternal Grandfather    • Hypertension Maternal Grandfather         essential    • Diabetes Maternal Grandfather    • Cancer Paternal Grandmother         malignant neoplas of breast    • Cancer Paternal Grandfather         maligannt neoplasm of prostate    • Hearing loss Paternal Grandfather    • Heart disease Paternal Grandfather    • Colon cancer Paternal Grandfather    • Hypertension Paternal Grandfather         essential    • Hyperlipidemia Paternal Grandfather    • Heart disease Paternal Uncle    • Irregular heart beat Daughter    • Thyroid cancer Maternal Aunt    • Thyroid disease Maternal Aunt    • Cancer Maternal Aunt    • Diabetes Maternal Grandmother      I have reviewed and agree with the history as documented  E-Cigarette/Vaping     E-Cigarette/Vaping Substances     Social History     Tobacco Use   • Smoking status: Never   • Smokeless tobacco: Never   Substance Use Topics   • Alcohol use: Yes     Alcohol/week: 2 0 standard drinks     Types: 2 Glasses of wine per week     Comment: socially   • Drug use: No       Review of Systems   Constitutional: Negative for diaphoresis, fatigue and fever  HENT: Negative for congestion, ear pain, nosebleeds and sore throat  Eyes: Negative for photophobia, pain, discharge and visual disturbance  Respiratory: Negative for cough, choking, chest tightness, shortness of breath and wheezing  Cardiovascular: Negative for chest pain and palpitations  Gastrointestinal: Negative for abdominal distention, abdominal pain, diarrhea and vomiting  Genitourinary: Negative for dysuria, flank pain and frequency  Musculoskeletal: Negative for back pain, gait problem and joint swelling  Skin: Negative for color change and rash  Neurological: Negative for dizziness, syncope and headaches  Psychiatric/Behavioral: Negative for behavioral problems and confusion  The patient is not nervous/anxious  All other systems reviewed and are negative        Physical Exam  Physical Exam  Vitals and nursing note reviewed  Constitutional:       General: She is not in acute distress  Appearance: She is well-developed  She is not ill-appearing or toxic-appearing  HENT:      Head: Normocephalic and atraumatic  Mouth/Throat:      Dentition: Normal dentition  Eyes:      General:         Right eye: No discharge  Left eye: No discharge  Cardiovascular:      Rate and Rhythm: Normal rate and regular rhythm  Pulmonary:      Effort: Pulmonary effort is normal  No accessory muscle usage or respiratory distress  Abdominal:      General: There is no distension  Tenderness: There is no guarding  Musculoskeletal:         General: Normal range of motion  Right hand: Tenderness (Right fourth digit distal pad) present  Cervical back: Normal range of motion and neck supple  Skin:     General: Skin is warm and dry  Neurological:      Mental Status: She is alert and oriented to person, place, and time  Coordination: Coordination normal    Psychiatric:         Behavior: Behavior is cooperative           Vital Signs  ED Triage Vitals   Temperature Pulse Respirations Blood Pressure SpO2   02/05/23 1027 02/05/23 1027 02/05/23 1027 02/05/23 1027 02/05/23 1027   98 7 °F (37 1 °C) 83 17 119/64 99 %      Temp Source Heart Rate Source Patient Position - Orthostatic VS BP Location FiO2 (%)   02/05/23 1027 02/05/23 1027 02/05/23 1027 02/05/23 1027 --   Temporal Monitor Sitting Left arm       Pain Score       02/05/23 1039       8           Vitals:    02/05/23 1027   BP: 119/64   Pulse: 83   Patient Position - Orthostatic VS: Sitting         Visual Acuity      ED Medications  Medications   ibuprofen (MOTRIN) tablet 600 mg (600 mg Oral Given 2/5/23 1039)       Diagnostic Studies  Results Reviewed     None                 XR finger fourth digit-ring RIGHT   ED Interpretation by ALEX Mortensen (02/05 1111)   No fracture identified      Final Result by Shahram Avilez MD (02/05 1314)      No acute osseous abnormality  Workstation performed: [id number]                    Procedures  Procedures         ED Course                               SBIRT 20yo+    Flowsheet Row Most Recent Value   SBIRT (23 yo +)    In order to provide better care to our patients, we are screening all of our patients for alcohol and drug use  Would it be okay to ask you these screening questions? Yes Filed at: 02/05/2023 1054   Initial Alcohol Screen: US AUDIT-C     1  How often do you have a drink containing alcohol? 0 Filed at: 02/05/2023 1054   2  How many drinks containing alcohol do you have on a typical day you are drinking? 0 Filed at: 02/05/2023 1054   3b  FEMALE Any Age, or MALE 65+: How often do you have 4 or more drinks on one occassion? 0 Filed at: 02/05/2023 1054   Audit-C Score 0 Filed at: 02/05/2023 1054   DIALLO: How many times in the past year have you    Used an illegal drug or used a prescription medication for non-medical reasons? Never Filed at: 02/05/2023 1054                    Medical Decision Making  Amount and/or Complexity of Data Reviewed  Radiology: ordered and independent interpretation performed  Risk  Prescription drug management  Disposition  Final diagnoses:   Contusion of right ring finger without damage to nail, initial encounter     Time reflects when diagnosis was documented in both MDM as applicable and the Disposition within this note     Time User Action Codes Description Comment    2/5/2023 11:13 AM Rhonda Nixon Add [S60 [id number]] Contusion of right ring finger without damage to nail, initial encounter       ED Disposition     ED Disposition   Discharge    Condition   Stable    Date/Time   Sun Feb 5, 2023 11:13 AM    Comment   Janel Litten discharge to home/self care                 Follow-up Information     Follow up With Specialties Details Why Contact Info Additional Information    [address] [facility] Emergency [facility] Emergency Medicine  As needed [address] [place] [address] [place] [id number] [facility] Emergency Department, [address] [facility], [place], [place], [facility]          Discharge Medication List as of 2/5/2023 11:14 AM      START taking these medications    Details   HYDROcodone-acetaminophen (NORCO) 5-325 mg per tablet Take 1 tablet by mouth every 6 (six) hours as needed for pain for up to 12 doses Max Daily Amount: 4 tablets, Starting Sun 2/5/2023, Normal         CONTINUE these medications which have NOT CHANGED    Details   acetaminophen (TYLENOL) 325 mg tablet Take 2 tablets by mouth every 6 (six) hours as needed for headaches or fever, Starting Fri 10/21/2016, No Print      albuterol (PROVENTIL HFA,VENTOLIN HFA) 90 mcg/act inhaler Inhale 2 puffs every 6 (six) hours as needed for wheezing, Historical Med      pantoprazole (PROTONIX) 40 mg tablet Take 40 mg by mouth daily, Starting u 3/25/2021, Historical Med      propranolol (INDERAL) 10 mg tablet Starting Fri 12/20/2019, Historical Med             No discharge procedures on file      PDMP Review     None          ED Provider  Electronically Signed by           ALEX Luna  02/05/23 5497

## (undated) DEVICE — 3M™ TEGADERM™ TRANSPARENT FILM DRESSING FRAME STYLE, 1624W, 2-3/8 IN X 2-3/4 IN (6 CM X 7 CM), 100/CT 4CT/CASE: Brand: 3M™ TEGADERM™

## (undated) DEVICE — GLOVE INDICATOR PI UNDERGLOVE SZ 7.5 BLUE

## (undated) DEVICE — GLOVE SRG BIOGEL ECLIPSE 7.5

## (undated) DEVICE — SUT NUROLON 0 CTX C551D

## (undated) DEVICE — DRAPE EQUIPMENT RF WAND

## (undated) DEVICE — ELECTRODE LAP L WIRE E-Z CLEAN 33CM -0100

## (undated) DEVICE — GAUZE SPONGES,8 PLY: Brand: CURITY

## (undated) DEVICE — ALLENTOWN LAP CHOLE APP PACK: Brand: CARDINAL HEALTH

## (undated) DEVICE — [HIGH FLOW INSUFFLATOR,  DO NOT USE IF PACKAGE IS DAMAGED,  KEEP DRY,  KEEP AWAY FROM SUNLIGHT,  PROTECT FROM HEAT AND RADIOACTIVE SOURCES.]: Brand: PNEUMOSURE

## (undated) DEVICE — PAD GROUNDING ADULT

## (undated) DEVICE — HYDROPHILIC WOUND DRESSING WITH ZINC PLUS VITAMINS A AND B6.: Brand: DERMAGRAN®-B

## (undated) DEVICE — LIGHT HANDLE COVER SLEEVE DISP BLUE STELLAR

## (undated) DEVICE — TROCAR: Brand: KII FIOS FIRST ENTRY

## (undated) DEVICE — CHLORAPREP HI-LITE 26ML ORANGE

## (undated) DEVICE — CLOSURE DEVICE ENDO CLOSE

## (undated) DEVICE — SUT VICRYL 4-0 PS-2 27 IN J426H

## (undated) DEVICE — TROCAR: Brand: KII® SLEEVE

## (undated) DEVICE — 3M™ STERI-STRIP™ REINFORCED ADHESIVE SKIN CLOSURES, R1541, 1/4 IN X 3 IN (6 MM X 75 MM), 3 STRIPS/ENVELOPE: Brand: 3M™ STERI-STRIP™

## (undated) DEVICE — ENDOPATH PNEUMONEEDLE INSUFFLATION NEEDLES WITH LUER LOCK CONNECTORS 120MM: Brand: ENDOPATH

## (undated) DEVICE — 3M™ STERI-STRIP™ REINFORCED ADHESIVE SKIN CLOSURES, R1546, 1/4 IN X 4 IN (6 MM X 100 MM), 10 STRIPS/ENVELOPE: Brand: 3M™ STERI-STRIP™

## (undated) DEVICE — INTENDED FOR TISSUE SEPARATION, AND OTHER PROCEDURES THAT REQUIRE A SHARP SURGICAL BLADE TO PUNCTURE OR CUT.: Brand: BARD-PARKER SAFETY BLADES SIZE 15, STERILE